# Patient Record
Sex: FEMALE | Race: BLACK OR AFRICAN AMERICAN | NOT HISPANIC OR LATINO | Employment: OTHER | URBAN - METROPOLITAN AREA
[De-identification: names, ages, dates, MRNs, and addresses within clinical notes are randomized per-mention and may not be internally consistent; named-entity substitution may affect disease eponyms.]

---

## 2023-03-09 DIAGNOSIS — E78.5 DYSLIPIDEMIA: ICD-10-CM

## 2023-03-10 RX ORDER — SIMVASTATIN 20 MG/1
20 TABLET, FILM COATED ORAL DAILY
COMMUNITY
Start: 2016-08-23 | End: 2023-03-10 | Stop reason: SDUPTHER

## 2023-03-10 RX ORDER — SIMVASTATIN 20 MG/1
20 TABLET, FILM COATED ORAL DAILY
Qty: 90 TABLET | Refills: 0 | Status: SHIPPED | OUTPATIENT
Start: 2023-03-10 | End: 2023-06-13 | Stop reason: SDUPTHER

## 2023-03-13 ENCOUNTER — TELEPHONE (OUTPATIENT)
Dept: PRIMARY CARE | Facility: CLINIC | Age: 73
End: 2023-03-13
Payer: MEDICARE

## 2023-03-13 DIAGNOSIS — I10 HYPERTENSION, UNSPECIFIED TYPE: Primary | ICD-10-CM

## 2023-03-13 RX ORDER — LISINOPRIL 40 MG/1
40 TABLET ORAL DAILY
Qty: 30 TABLET | Refills: 5 | Status: SHIPPED | OUTPATIENT
Start: 2023-03-13 | End: 2023-11-06 | Stop reason: SDUPTHER

## 2023-05-08 ENCOUNTER — OFFICE VISIT (OUTPATIENT)
Dept: PRIMARY CARE | Facility: CLINIC | Age: 73
End: 2023-05-08
Payer: MEDICARE

## 2023-05-08 VITALS
WEIGHT: 251 LBS | SYSTOLIC BLOOD PRESSURE: 128 MMHG | HEIGHT: 62 IN | HEART RATE: 70 BPM | BODY MASS INDEX: 46.19 KG/M2 | DIASTOLIC BLOOD PRESSURE: 75 MMHG

## 2023-05-08 DIAGNOSIS — Z78.0 ASYMPTOMATIC POSTMENOPAUSAL ESTROGEN DEFICIENCY: Primary | ICD-10-CM

## 2023-05-08 DIAGNOSIS — Z00.00 ANNUAL PHYSICAL EXAM: ICD-10-CM

## 2023-05-08 DIAGNOSIS — E78.5 DYSLIPIDEMIA: ICD-10-CM

## 2023-05-08 DIAGNOSIS — I10 PRIMARY HYPERTENSION: ICD-10-CM

## 2023-05-08 PROCEDURE — 1159F MED LIST DOCD IN RCRD: CPT | Performed by: INTERNAL MEDICINE

## 2023-05-08 PROCEDURE — 3078F DIAST BP <80 MM HG: CPT | Performed by: INTERNAL MEDICINE

## 2023-05-08 PROCEDURE — 3074F SYST BP LT 130 MM HG: CPT | Performed by: INTERNAL MEDICINE

## 2023-05-08 PROCEDURE — 99214 OFFICE O/P EST MOD 30 MIN: CPT | Performed by: INTERNAL MEDICINE

## 2023-05-08 PROCEDURE — 1160F RVW MEDS BY RX/DR IN RCRD: CPT | Performed by: INTERNAL MEDICINE

## 2023-05-08 RX ORDER — ATENOLOL 50 MG/1
50 TABLET ORAL DAILY
COMMUNITY
End: 2023-05-30 | Stop reason: SDUPTHER

## 2023-05-08 RX ORDER — LATANOPROST 50 UG/ML
1 SOLUTION/ DROPS OPHTHALMIC DAILY
COMMUNITY
Start: 2019-04-23 | End: 2023-11-22

## 2023-05-08 RX ORDER — BRIMONIDINE TARTRATE 2 MG/ML
1 SOLUTION/ DROPS OPHTHALMIC 2 TIMES DAILY
COMMUNITY
Start: 2019-06-05 | End: 2023-11-22

## 2023-05-08 RX ORDER — FLUTICASONE PROPIONATE 50 MCG
2 SPRAY, SUSPENSION (ML) NASAL DAILY
COMMUNITY
Start: 2020-08-03 | End: 2024-05-10 | Stop reason: ALTCHOICE

## 2023-05-08 RX ORDER — MELOXICAM 15 MG/1
7.5 TABLET ORAL DAILY PRN
COMMUNITY
Start: 2017-08-22 | End: 2024-04-04 | Stop reason: SDUPTHER

## 2023-05-08 RX ORDER — SOLIFENACIN SUCCINATE 10 MG/1
10 TABLET, FILM COATED ORAL DAILY
COMMUNITY
Start: 2021-01-19 | End: 2024-05-08 | Stop reason: SDUPTHER

## 2023-05-08 RX ORDER — DORZOLAMIDE HYDROCHLORIDE AND TIMOLOL MALEATE 20; 5 MG/ML; MG/ML
1 SOLUTION/ DROPS OPHTHALMIC 2 TIMES DAILY
COMMUNITY
Start: 2019-04-23 | End: 2023-11-22

## 2023-05-08 NOTE — PROGRESS NOTES
I reviewed with the resident the medical history and the resident’s findings on physical examination.  I discussed with the resident the patient’s diagnosis and concur with the treatment plan as documented in the resident note.     Valarie Myrick MD

## 2023-05-08 NOTE — PATIENT INSTRUCTIONS
[facility] [place],     The only test you are due for is a bone density test to screen for osteoporosis. Dru [phone] to schedule this.     See me back in 6 months and ask them to schedule it as a PHYSICAL aka Medicare wellness visit!       CL

## 2023-05-08 NOTE — PROGRESS NOTES
Subjective   Patient ID: Drea Anne is a 73 y.o. female who presents for annual physical f/up.  HPI  Pt doing well overall since last visit. Being seen by urogyn for possible eval for interstim bladder device for OAB. Taking all meds as prescribed, no issue with acquiring. Not taking BP at home but always normal on doctor's visits. No issues with hypotension.       Review of Systems  Negative unless stated otherwise above.    Objective   Vitals:    05/08/23 0953   BP: 128/75   Pulse: 70      GEN: well appearing, NAD  HEENT: MMM, pharynx clear; no notable LAE  EYES: sclera clear, EOMI intact  CV: RRR, nl S1/S2; wwp  PULM: CTAB, no w/r/r; normal WoB on RA  ABD: Soft, NT  EXT:  no asymmetry, no edema  SKIN: Warm, dry, no rash  NEURO: no focal deficits; CN II-XII grossly intact; Aox3    Assessment/Plan   Problem List Items Addressed This Visit          Circulatory    Primary hypertension       Other    Annual physical exam    Dyslipidemia     Other Visit Diagnoses       Asymptomatic postmenopausal estrogen deficiency    -  Primary    Relevant Orders    XR DEXA bone density          72 yo here for f/up.     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     # BCA s/p rxn, XRT   -anatrazole   -5 year checkup will be fall 2023    #CTS   -ctm    #HTN   -amlo 10   -aten 50   -quinapril 40     # OAB, incontinence  -planning to eval for interstim devive  -FU with urogyne   -solifenacin     # Obesity   -lost 10 lbs since 2021; down to 251 as of May 2023    #DLD   -c/w simva     #Knee pain L>R, h/o L knee surgery post fall  -no h/o injxns   -will call if sxs worsen   -occ meloxicam     #HM   -tdap 2021   -pneumovax utd   -discussed shingrix; revisit as previously required $300 copay   -mammo 9-10-22   -dexa 2020 normal; repeat now  -cscope 2020; repeat 5-10 years (aged out)

## 2023-05-25 DIAGNOSIS — I10 PRIMARY HYPERTENSION: ICD-10-CM

## 2023-05-25 RX ORDER — ATENOLOL 50 MG/1
50 TABLET ORAL DAILY
Qty: 90 TABLET | Refills: 3 | Status: CANCELLED | OUTPATIENT
Start: 2023-05-25

## 2023-05-31 DIAGNOSIS — I10 PRIMARY HYPERTENSION: ICD-10-CM

## 2023-05-31 RX ORDER — ATENOLOL 50 MG/1
50 TABLET ORAL DAILY
Qty: 90 TABLET | Refills: 3 | Status: SHIPPED | OUTPATIENT
Start: 2023-05-31 | End: 2023-11-20 | Stop reason: SDUPTHER

## 2023-05-31 RX ORDER — ATENOLOL 50 MG/1
50 TABLET ORAL DAILY
Qty: 90 TABLET | Refills: 3 | Status: SHIPPED | OUTPATIENT
Start: 2023-05-31 | End: 2023-05-31 | Stop reason: SDUPTHER

## 2023-06-05 ENCOUNTER — TELEPHONE (OUTPATIENT)
Dept: PRIMARY CARE | Facility: CLINIC | Age: 73
End: 2023-06-05
Payer: MEDICARE

## 2023-06-05 NOTE — TELEPHONE ENCOUNTER
Patient say she will send over a [employer] form to have you fill, she say she cant see too good and can not drive so she is applying to [employer] to take her to all her appointments

## 2023-06-13 DIAGNOSIS — E78.5 DYSLIPIDEMIA: ICD-10-CM

## 2023-06-13 RX ORDER — SIMVASTATIN 20 MG/1
20 TABLET, FILM COATED ORAL DAILY
Qty: 90 TABLET | Refills: 2 | Status: SHIPPED | OUTPATIENT
Start: 2023-06-13 | End: 2023-09-26 | Stop reason: SDUPTHER

## 2023-06-30 ENCOUNTER — TELEPHONE (OUTPATIENT)
Dept: PRIMARY CARE | Facility: CLINIC | Age: 73
End: 2023-06-30
Payer: MEDICARE

## 2023-06-30 DIAGNOSIS — M15.9 GENERALIZED OSTEOARTHRITIS: Primary | ICD-10-CM

## 2023-08-10 ENCOUNTER — TELEPHONE (OUTPATIENT)
Dept: PRIMARY CARE | Facility: CLINIC | Age: 73
End: 2023-08-10
Payer: MEDICARE

## 2023-08-11 DIAGNOSIS — Z12.31 BREAST CANCER SCREENING BY MAMMOGRAM: ICD-10-CM

## 2023-08-11 DIAGNOSIS — Z78.0 ASYMPTOMATIC POSTMENOPAUSAL ESTROGEN DEFICIENCY: Primary | ICD-10-CM

## 2023-09-26 ENCOUNTER — TELEPHONE (OUTPATIENT)
Dept: PRIMARY CARE | Facility: CLINIC | Age: 73
End: 2023-09-26
Payer: MEDICARE

## 2023-09-26 DIAGNOSIS — E78.5 DYSLIPIDEMIA: ICD-10-CM

## 2023-09-26 NOTE — TELEPHONE ENCOUNTER
Patient miss placed  simvastatin, pharmacy said Doctor had to send a new script to [facility] , she is out of meds

## 2023-09-27 RX ORDER — SIMVASTATIN 20 MG/1
20 TABLET, FILM COATED ORAL DAILY
Qty: 90 TABLET | Refills: 1 | Status: SHIPPED | OUTPATIENT
Start: 2023-09-27 | End: 2023-11-20 | Stop reason: SDUPTHER

## 2023-10-01 ENCOUNTER — PREP FOR PROCEDURE (OUTPATIENT)
Dept: OPERATING ROOM | Facility: CLINIC | Age: 73
End: 2023-10-01
Payer: MEDICARE

## 2023-10-01 RX ORDER — CELECOXIB 200 MG/1
400 CAPSULE ORAL ONCE
Status: CANCELLED | OUTPATIENT
Start: 2023-10-01 | End: 2023-10-01

## 2023-10-01 RX ORDER — CEFAZOLIN SODIUM 2 G/100ML
2 INJECTION, SOLUTION INTRAVENOUS ONCE
Status: DISCONTINUED | OUTPATIENT
Start: 2023-10-02 | End: 2023-10-01 | Stop reason: HOSPADM

## 2023-10-01 RX ORDER — ACETAMINOPHEN 325 MG/1
975 TABLET ORAL ONCE
Status: CANCELLED | OUTPATIENT
Start: 2023-10-01 | End: 2023-10-01

## 2023-10-02 ENCOUNTER — ANESTHESIA (OUTPATIENT)
Dept: OPERATING ROOM | Facility: CLINIC | Age: 73
End: 2023-10-02
Payer: MEDICARE

## 2023-10-02 ENCOUNTER — ANCILLARY PROCEDURE (OUTPATIENT)
Dept: RADIOLOGY | Facility: CLINIC | Age: 73
End: 2023-10-02
Payer: MEDICARE

## 2023-10-02 ENCOUNTER — HOSPITAL ENCOUNTER (OUTPATIENT)
Facility: CLINIC | Age: 73
Setting detail: OUTPATIENT SURGERY
Discharge: HOME | End: 2023-10-02
Attending: OPHTHALMOLOGY | Admitting: OPHTHALMOLOGY
Payer: MEDICARE

## 2023-10-02 ENCOUNTER — ANESTHESIA EVENT (OUTPATIENT)
Dept: OPERATING ROOM | Facility: CLINIC | Age: 73
End: 2023-10-02
Payer: MEDICARE

## 2023-10-02 VITALS
WEIGHT: 249.12 LBS | OXYGEN SATURATION: 98 % | DIASTOLIC BLOOD PRESSURE: 69 MMHG | TEMPERATURE: 97.3 F | RESPIRATION RATE: 20 BRPM | HEIGHT: 62 IN | SYSTOLIC BLOOD PRESSURE: 139 MMHG | HEART RATE: 65 BPM | BODY MASS INDEX: 45.84 KG/M2

## 2023-10-02 DIAGNOSIS — N32.81 OAB (OVERACTIVE BLADDER): Primary | ICD-10-CM

## 2023-10-02 DIAGNOSIS — I10 PRIMARY HYPERTENSION: ICD-10-CM

## 2023-10-02 DIAGNOSIS — Z96.89 S/P INSERTION OF SACRAL NERVE STIMULATOR: ICD-10-CM

## 2023-10-02 PROBLEM — E66.01 CLASS 3 SEVERE OBESITY DUE TO EXCESS CALORIES WITH BODY MASS INDEX (BMI) OF 45.0 TO 49.9 IN ADULT (MULTI): Status: ACTIVE | Noted: 2023-10-02

## 2023-10-02 PROBLEM — E66.813 CLASS 3 SEVERE OBESITY DUE TO EXCESS CALORIES WITH BODY MASS INDEX (BMI) OF 45.0 TO 49.9 IN ADULT: Status: ACTIVE | Noted: 2023-10-02

## 2023-10-02 PROCEDURE — 2780000003 HC OR 278 NO HCPCS: Performed by: OBSTETRICS & GYNECOLOGY

## 2023-10-02 PROCEDURE — 3600000009 HC OR TIME - EACH INCREMENTAL 1 MINUTE - PROCEDURE LEVEL FOUR: Performed by: OBSTETRICS & GYNECOLOGY

## 2023-10-02 PROCEDURE — 76000 FLUOROSCOPY <1 HR PHYS/QHP: CPT | Performed by: OBSTETRICS & GYNECOLOGY

## 2023-10-02 PROCEDURE — 2500000005 HC RX 250 GENERAL PHARMACY W/O HCPCS

## 2023-10-02 PROCEDURE — 2720000007 HC OR 272 NO HCPCS: Performed by: OBSTETRICS & GYNECOLOGY

## 2023-10-02 PROCEDURE — 64590 INS/RPL PRPH SAC/GSTR NPG/R: CPT | Performed by: OBSTETRICS & GYNECOLOGY

## 2023-10-02 PROCEDURE — 2580000001 HC RX 258 IV SOLUTIONS: Performed by: STUDENT IN AN ORGANIZED HEALTH CARE EDUCATION/TRAINING PROGRAM

## 2023-10-02 PROCEDURE — 3700000001 HC GENERAL ANESTHESIA TIME - INITIAL BASE CHARGE: Performed by: OBSTETRICS & GYNECOLOGY

## 2023-10-02 PROCEDURE — 76000 FLUOROSCOPY <1 HR PHYS/QHP: CPT

## 2023-10-02 PROCEDURE — 99100 ANES PT EXTEME AGE<1 YR&>70: CPT | Performed by: STUDENT IN AN ORGANIZED HEALTH CARE EDUCATION/TRAINING PROGRAM

## 2023-10-02 PROCEDURE — 2500000004 HC RX 250 GENERAL PHARMACY W/ HCPCS (ALT 636 FOR OP/ED)

## 2023-10-02 PROCEDURE — C1778 LEAD, NEUROSTIMULATOR: HCPCS | Performed by: OBSTETRICS & GYNECOLOGY

## 2023-10-02 PROCEDURE — 7100000002 HC RECOVERY ROOM TIME - EACH INCREMENTAL 1 MINUTE: Performed by: OBSTETRICS & GYNECOLOGY

## 2023-10-02 PROCEDURE — 64581 OPN IMPLTJ NEA SACRAL NERVE: CPT | Performed by: OBSTETRICS & GYNECOLOGY

## 2023-10-02 PROCEDURE — 2500000005 HC RX 250 GENERAL PHARMACY W/O HCPCS: Performed by: OBSTETRICS & GYNECOLOGY

## 2023-10-02 PROCEDURE — 2580000001 HC RX 258 IV SOLUTIONS

## 2023-10-02 PROCEDURE — A64561 PR PRQ IMPLTJ NEUROSTIM ELTRD SACRAL NRVE W/IMAGING

## 2023-10-02 PROCEDURE — 3700000002 HC GENERAL ANESTHESIA TIME - EACH INCREMENTAL 1 MINUTE: Performed by: OBSTETRICS & GYNECOLOGY

## 2023-10-02 PROCEDURE — 3600000004 HC OR TIME - INITIAL BASE CHARGE - PROCEDURE LEVEL FOUR: Performed by: OBSTETRICS & GYNECOLOGY

## 2023-10-02 PROCEDURE — C1883 ADAPT/EXT, PACING/NEURO LEAD: HCPCS | Performed by: OBSTETRICS & GYNECOLOGY

## 2023-10-02 PROCEDURE — 7100000009 HC PHASE TWO TIME - INITIAL BASE CHARGE: Performed by: OBSTETRICS & GYNECOLOGY

## 2023-10-02 PROCEDURE — 7100000001 HC RECOVERY ROOM TIME - INITIAL BASE CHARGE: Performed by: OBSTETRICS & GYNECOLOGY

## 2023-10-02 PROCEDURE — 7100000010 HC PHASE TWO TIME - EACH INCREMENTAL 1 MINUTE: Performed by: OBSTETRICS & GYNECOLOGY

## 2023-10-02 PROCEDURE — A64561 PR PRQ IMPLTJ NEUROSTIM ELTRD SACRAL NRVE W/IMAGING: Performed by: STUDENT IN AN ORGANIZED HEALTH CARE EDUCATION/TRAINING PROGRAM

## 2023-10-02 DEVICE — LEAD KIT, INTERSTIM SURESCAN MRI 4.32MM SPACING, 28CM LENGTH: Type: IMPLANTABLE DEVICE | Site: BACK | Status: FUNCTIONAL

## 2023-10-02 RX ORDER — DIPHENHYDRAMINE HYDROCHLORIDE 50 MG/ML
12.5 INJECTION INTRAMUSCULAR; INTRAVENOUS ONCE AS NEEDED
Status: DISCONTINUED | OUTPATIENT
Start: 2023-10-02 | End: 2023-10-02 | Stop reason: HOSPADM

## 2023-10-02 RX ORDER — FENTANYL CITRATE 50 UG/ML
INJECTION, SOLUTION INTRAMUSCULAR; INTRAVENOUS AS NEEDED
Status: DISCONTINUED | OUTPATIENT
Start: 2023-10-02 | End: 2023-10-02

## 2023-10-02 RX ORDER — OXYCODONE HYDROCHLORIDE 5 MG/1
5 TABLET ORAL EVERY 6 HOURS PRN
Qty: 5 TABLET | Refills: 0 | Status: SHIPPED | OUTPATIENT
Start: 2023-10-02 | End: 2023-12-11 | Stop reason: ALTCHOICE

## 2023-10-02 RX ORDER — NORETHINDRONE AND ETHINYL ESTRADIOL 0.5-0.035
KIT ORAL AS NEEDED
Status: DISCONTINUED | OUTPATIENT
Start: 2023-10-02 | End: 2023-10-02

## 2023-10-02 RX ORDER — LIDOCAINE HYDROCHLORIDE 10 MG/ML
0.1 INJECTION, SOLUTION EPIDURAL; INFILTRATION; INTRACAUDAL; PERINEURAL ONCE
Status: DISCONTINUED | OUTPATIENT
Start: 2023-10-02 | End: 2023-10-02 | Stop reason: HOSPADM

## 2023-10-02 RX ORDER — PROPOFOL 10 MG/ML
INJECTION, EMULSION INTRAVENOUS AS NEEDED
Status: DISCONTINUED | OUTPATIENT
Start: 2023-10-02 | End: 2023-10-02

## 2023-10-02 RX ORDER — ONDANSETRON HYDROCHLORIDE 2 MG/ML
4 INJECTION, SOLUTION INTRAVENOUS ONCE AS NEEDED
Status: DISCONTINUED | OUTPATIENT
Start: 2023-10-02 | End: 2023-10-02 | Stop reason: HOSPADM

## 2023-10-02 RX ORDER — HYDRALAZINE HYDROCHLORIDE 20 MG/ML
5 INJECTION INTRAMUSCULAR; INTRAVENOUS EVERY 30 MIN PRN
Status: DISCONTINUED | OUTPATIENT
Start: 2023-10-02 | End: 2023-10-02 | Stop reason: HOSPADM

## 2023-10-02 RX ORDER — CEFAZOLIN SODIUM 2 G/100ML
INJECTION, SOLUTION INTRAVENOUS AS NEEDED
Status: DISCONTINUED | OUTPATIENT
Start: 2023-10-02 | End: 2023-10-02

## 2023-10-02 RX ORDER — ALBUTEROL SULFATE 0.83 MG/ML
2.5 SOLUTION RESPIRATORY (INHALATION) ONCE AS NEEDED
Status: DISCONTINUED | OUTPATIENT
Start: 2023-10-02 | End: 2023-10-02 | Stop reason: HOSPADM

## 2023-10-02 RX ORDER — PHENYLEPHRINE HCL IN 0.9% NACL 0.4MG/10ML
SYRINGE (ML) INTRAVENOUS AS NEEDED
Status: DISCONTINUED | OUTPATIENT
Start: 2023-10-02 | End: 2023-10-02

## 2023-10-02 RX ORDER — AMLODIPINE BESYLATE 10 MG/1
10 TABLET ORAL DAILY
COMMUNITY
Start: 2017-06-14 | End: 2023-10-02 | Stop reason: SDUPTHER

## 2023-10-02 RX ORDER — ACETAMINOPHEN 500 MG
1000 TABLET ORAL EVERY 6 HOURS PRN
Qty: 30 TABLET | Refills: 0 | Status: SHIPPED | OUTPATIENT
Start: 2023-10-02

## 2023-10-02 RX ORDER — SUCCINYLCHOLINE CHLORIDE 20 MG/ML
INJECTION INTRAMUSCULAR; INTRAVENOUS AS NEEDED
Status: DISCONTINUED | OUTPATIENT
Start: 2023-10-02 | End: 2023-10-02

## 2023-10-02 RX ORDER — IBUPROFEN 600 MG/1
600 TABLET ORAL 3 TIMES DAILY PRN
Qty: 60 TABLET | Refills: 0 | Status: SHIPPED | OUTPATIENT
Start: 2023-10-02

## 2023-10-02 RX ORDER — AMLODIPINE BESYLATE 2.5 MG/1
2.5 TABLET ORAL DAILY
COMMUNITY
End: 2023-10-02 | Stop reason: DRUGHIGH

## 2023-10-02 RX ORDER — LABETALOL HYDROCHLORIDE 5 MG/ML
5 INJECTION, SOLUTION INTRAVENOUS ONCE AS NEEDED
Status: DISCONTINUED | OUTPATIENT
Start: 2023-10-02 | End: 2023-10-02 | Stop reason: HOSPADM

## 2023-10-02 RX ORDER — CELECOXIB 200 MG/1
400 CAPSULE ORAL ONCE
Status: DISCONTINUED | OUTPATIENT
Start: 2023-10-02 | End: 2023-10-02 | Stop reason: HOSPADM

## 2023-10-02 RX ORDER — ACETAMINOPHEN 325 MG/1
975 TABLET ORAL ONCE
Status: DISCONTINUED | OUTPATIENT
Start: 2023-10-02 | End: 2023-10-02 | Stop reason: HOSPADM

## 2023-10-02 RX ORDER — LIDOCAINE HYDROCHLORIDE 20 MG/ML
INJECTION, SOLUTION INFILTRATION; PERINEURAL AS NEEDED
Status: DISCONTINUED | OUTPATIENT
Start: 2023-10-02 | End: 2023-10-02

## 2023-10-02 RX ORDER — SODIUM CHLORIDE, SODIUM LACTATE, POTASSIUM CHLORIDE, CALCIUM CHLORIDE 600; 310; 30; 20 MG/100ML; MG/100ML; MG/100ML; MG/100ML
100 INJECTION, SOLUTION INTRAVENOUS CONTINUOUS
Status: DISCONTINUED | OUTPATIENT
Start: 2023-10-02 | End: 2023-10-02 | Stop reason: HOSPADM

## 2023-10-02 RX ORDER — REMIFENTANIL HYDROCHLORIDE 1 MG/ML
INJECTION, POWDER, LYOPHILIZED, FOR SOLUTION INTRAVENOUS AS NEEDED
Status: DISCONTINUED | OUTPATIENT
Start: 2023-10-02 | End: 2023-10-02

## 2023-10-02 RX ORDER — LISINOPRIL 5 MG/1
5 TABLET ORAL DAILY
COMMUNITY
End: 2023-11-20 | Stop reason: WASHOUT

## 2023-10-02 RX ORDER — ONDANSETRON HYDROCHLORIDE 2 MG/ML
INJECTION, SOLUTION INTRAVENOUS AS NEEDED
Status: DISCONTINUED | OUTPATIENT
Start: 2023-10-02 | End: 2023-10-02

## 2023-10-02 RX ORDER — DROPERIDOL 2.5 MG/ML
0.62 INJECTION, SOLUTION INTRAMUSCULAR; INTRAVENOUS ONCE AS NEEDED
Status: DISCONTINUED | OUTPATIENT
Start: 2023-10-02 | End: 2023-10-02 | Stop reason: HOSPADM

## 2023-10-02 RX ORDER — GLYCOPYRROLATE 0.2 MG/ML
INJECTION INTRAMUSCULAR; INTRAVENOUS AS NEEDED
Status: DISCONTINUED | OUTPATIENT
Start: 2023-10-02 | End: 2023-10-02

## 2023-10-02 RX ORDER — LIDOCAINE HYDROCHLORIDE 10 MG/ML
INJECTION INFILTRATION; PERINEURAL AS NEEDED
Status: DISCONTINUED | OUTPATIENT
Start: 2023-10-02 | End: 2023-10-02 | Stop reason: HOSPADM

## 2023-10-02 RX ADMIN — CEFAZOLIN SODIUM 2 G: 2 INJECTION, SOLUTION INTRAVENOUS at 14:45

## 2023-10-02 RX ADMIN — SUCCINYLCHOLINE CHLORIDE 160 MG: 20 INJECTION, SOLUTION INTRAMUSCULAR; INTRAVENOUS at 14:39

## 2023-10-02 RX ADMIN — SODIUM CHLORIDE, SODIUM LACTATE, POTASSIUM CHLORIDE, AND CALCIUM CHLORIDE: .6; .31; .03; .02 INJECTION, SOLUTION INTRAVENOUS at 14:30

## 2023-10-02 RX ADMIN — GLYCOPYRROLATE 0.4 MG: 0.2 INJECTION INTRAMUSCULAR; INTRAVENOUS at 15:03

## 2023-10-02 RX ADMIN — Medication 60 MCG: at 15:08

## 2023-10-02 RX ADMIN — DEXAMETHASONE SODIUM PHOSPHATE 4 MG: 4 INJECTION, SOLUTION INTRAMUSCULAR; INTRAVENOUS at 15:15

## 2023-10-02 RX ADMIN — EPHEDRINE SULFATE 15 MG: 50 INJECTION, SOLUTION INTRAVENOUS at 15:05

## 2023-10-02 RX ADMIN — REMIFENTANIL HYDROCHLORIDE 0.06 MCG/KG/MIN: 1 INJECTION, POWDER, LYOPHILIZED, FOR SOLUTION INTRAVENOUS at 14:46

## 2023-10-02 RX ADMIN — EPHEDRINE SULFATE 10 MG: 50 INJECTION, SOLUTION INTRAVENOUS at 15:20

## 2023-10-02 RX ADMIN — PROPOFOL 150 MG: 10 INJECTION, EMULSION INTRAVENOUS at 14:38

## 2023-10-02 RX ADMIN — FENTANYL CITRATE 50 MCG: 0.05 INJECTION, SOLUTION INTRAMUSCULAR; INTRAVENOUS at 14:38

## 2023-10-02 RX ADMIN — Medication 60 MCG: at 15:20

## 2023-10-02 RX ADMIN — LIDOCAINE HYDROCHLORIDE 60 MG: 20 INJECTION, SOLUTION INFILTRATION; PERINEURAL at 14:38

## 2023-10-02 RX ADMIN — ONDANSETRON 4 MG: 2 INJECTION, SOLUTION INTRAMUSCULAR; INTRAVENOUS at 15:35

## 2023-10-02 RX ADMIN — SODIUM CHLORIDE, POTASSIUM CHLORIDE, SODIUM LACTATE AND CALCIUM CHLORIDE 100 ML/HR: 600; 310; 30; 20 INJECTION, SOLUTION INTRAVENOUS at 14:15

## 2023-10-02 ASSESSMENT — COLUMBIA-SUICIDE SEVERITY RATING SCALE - C-SSRS
1. IN THE PAST MONTH, HAVE YOU WISHED YOU WERE DEAD OR WISHED YOU COULD GO TO SLEEP AND NOT WAKE UP?: NO
2. HAVE YOU ACTUALLY HAD ANY THOUGHTS OF KILLING YOURSELF?: NO
6. HAVE YOU EVER DONE ANYTHING, STARTED TO DO ANYTHING, OR PREPARED TO DO ANYTHING TO END YOUR LIFE?: NO

## 2023-10-02 ASSESSMENT — ENCOUNTER SYMPTOMS
MUSCULOSKELETAL NEGATIVE: 1
ENDOCRINE NEGATIVE: 1
CARDIOVASCULAR NEGATIVE: 1
CONSTITUTIONAL NEGATIVE: 1
EYES NEGATIVE: 1
RESPIRATORY NEGATIVE: 1
GASTROINTESTINAL NEGATIVE: 1

## 2023-10-02 ASSESSMENT — PAIN - FUNCTIONAL ASSESSMENT
PAIN_FUNCTIONAL_ASSESSMENT: 0-10

## 2023-10-02 ASSESSMENT — PAIN SCALES - GENERAL
PAINLEVEL_OUTOF10: 0 - NO PAIN
PAIN_LEVEL: 0
PAINLEVEL_OUTOF10: 0 - NO PAIN
PAINLEVEL_OUTOF10: 0 - NO PAIN

## 2023-10-02 NOTE — H&P
History Of Present Illness  Drea Anne is a 73 y.o. female presenting with OAB and urge incontinence. She had a successful PNE where she reported 50-75% improvement.     Past Medical History  She has a past medical history of Cancer (CMS/[facility]), Glaucoma, Hyperlipidemia, Hypertension, Other specified health status, Personal history of malignant neoplasm, unspecified, Personal history of other diseases of the musculoskeletal system and connective tissue, Personal history of other diseases of urinary system, Personal history of other endocrine, nutritional and metabolic disease, Personal history of other specified conditions (09/08/2016), Personal history of other specified conditions (12/11/2018), Personal history of other specified conditions (11/06/2018), and Unspecified abnormal findings in urine (08/31/2016).    Surgical History  She has a past surgical history that includes Other surgical history (11/21/2019) and Knee Arthroplasty.     Social History  She reports that she has never smoked. She does not have any smokeless tobacco history on file. She reports current drug use. Drug: Marijuana. No history on file for alcohol use.    Family History  No family history on file.     Allergies  Patient has no known allergies.    Review of Systems   Constitutional: Negative.    HENT: Negative.     Eyes: Negative.    Respiratory: Negative.     Cardiovascular: Negative.    Gastrointestinal: Negative.    Endocrine: Negative.    Genitourinary: Negative.    Musculoskeletal: Negative.    Skin: Negative.         Physical Exam  HENT:      Head: Normocephalic.   Cardiovascular:      Rate and Rhythm: Normal rate.   Pulmonary:      Effort: Pulmonary effort is normal.   Abdominal:      Palpations: Abdomen is soft.   Musculoskeletal:      Cervical back: Normal range of motion.   Skin:     General: Skin is warm.   Neurological:      General: No focal deficit present.      Mental Status: She is alert.   Psychiatric:         Mood and  "Affect: Mood normal.          Last Recorded Vitals  Pulse 60, temperature 36.2 °C (97.2 °F), temperature source Temporal, resp. rate 16, height 1.575 m (5' 2\"), weight 113 kg (249 lb 1.9 oz), SpO2 99 %.         Assessment/Plan   Active Problems:    Class 3 severe obesity due to excess calories with body mass index (BMI) of 45.0 to 49.9 in adult (CMS/[facility])    72 yo with OAB, urge incontinence s/pp successful PNE with Axonics. Here for Full Implant stage 1/2.    Plan: to OR for planned procedure         Ya Stringer MD    "

## 2023-10-02 NOTE — ANESTHESIA PROCEDURE NOTES
Airway  Date/Time: 10/2/2023 2:40 PM  Urgency: elective    Airway not difficult    Staffing  Performed: HALINA   Authorized by: Livia Hargrove MD    Performed by: HALINA Ansari  Patient location during procedure: OR    Indications and Patient Condition  Indications for airway management: anesthesia  Sedation level: deep  Preoxygenated: yes  Patient position: sniffing  MILS not maintained throughout  Mask difficulty assessment: 1 - vent by mask    Final Airway Details  Final airway type: endotracheal airway      Successful airway: ETT  Cuffed: yes   Successful intubation technique: direct laryngoscopy  Endotracheal tube insertion site: oral  Blade: Asael  Blade size: #3  ETT size (mm): 7.0  Cormack-Lehane Classification: grade I - full view of glottis  Placement verified by: chest auscultation and capnometry   Inital cuff pressure (cm H2O): 21  Measured from: lips  Number of attempts at approach: 1

## 2023-10-02 NOTE — ANESTHESIA POSTPROCEDURE EVALUATION
"Patient: Drea Anne    Procedure Summary       Date: 10/02/23 Room / Location: [facility] 02 / Virtual [facility]    Anesthesia Start: 1430 Anesthesia Stop: 1602    Procedure: FULL AXONICS IMPLANT (Back) Diagnosis:       Overactive bladder      (Overactive bladder [N32.81])    Surgeons: Ya Stringer MD Responsible Provider: Livia Hargrove MD    Anesthesia Type: general ASA Status: 3            Anesthesia Type: general  Visit Vitals  Pulse 60   Temp 36.2 °C (97.2 °F) (Temporal)   Resp 16   Ht 1.575 m (5' 2\")   Wt 113 kg (249 lb 1.9 oz)   SpO2 99%   BMI 45.56 kg/m²   Smoking Status Never   BSA 2.22 m²      Vitals Value Taken Time   BP  10/02/23 1620   Temp  10/02/23 1620   Pulse  10/02/23 1620   Resp  10/02/23 1620   SpO2  10/02/23 1620       Anesthesia Post Evaluation    Patient location during evaluation: PACU  Patient participation: complete - patient participated  Level of consciousness: awake and alert  Pain score: 0  Pain management: adequate  Airway patency: patent  Cardiovascular status: acceptable and hemodynamically stable  Respiratory status: acceptable  Hydration status: acceptable        No notable events documented.    "

## 2023-10-02 NOTE — OP NOTE
FULL AXONICS IMPLANT Operative Note     Date: 10/2/2023  OR Location: [facility]    Name: Drea Anne, : 1950, Age: 73 y.o., MRN: [id number], Sex: female    Diagnosis  Pre-op Diagnosis     * Overactive bladder [N32.81] Post-op Diagnosis     * Overactive bladder [N32.81]     Procedures    * FULL AXONICS IMPLANT    Surgeons      * Ya Stringer - Primary    Resident/Fellow/Other Assistant:  No surgical staff documented.    Procedure Summary  Anesthesia: Monitor Anesthesia Care  ASA: III  Anesthesia Staff: Anesthesiologist: Livia Hargrove MD  C-AA: HALINA Ansari  Estimated Blood Loss: 5mL  Intra-op Medications:   Medication Name Total Dose   lactated Ringer's infusion 108.33 mL              Anesthesia Record               Intraprocedure I/O Totals       None           Specimen: No specimens collected     Staff:   Circulator: Phuong Ruggiero RN; Dorinda Mosley RN  Relief Scrub: Kalli Lombardo; Janet Kerr  Scrub Person: Elhamwade Montieler         Drains and/or Catheters: * None in log *    Tourniquet Times:         Implants:  Implants       Type Name Action Serial No.       AXONICS LEAD IMPLANT KIT Implanted       AXONICS TINED LEAD KIT Implanted [id number]              Findings: good chaim response at <1mAmp at E0-2, 1.5mAmp at E3    Indications: Drea Anne is an 73 y.o. female who is having surgery for Overactive bladder [N32.81]. She had a successful PNE and opted proceed with full implant of Axonics SNM    The patient was seen in the preoperative area. The risks, benefits, complications, treatment options, non-operative alternatives, expected recovery and outcomes were discussed with the patient. The possibilities of reaction to medication, pulmonary aspiration, injury to surrounding structures, bleeding, recurrent infection, the need for additional procedures, failure to diagnose a condition, and creating a complication requiring transfusion or operation were discussed with the patient.  The patient concurred with the proposed plan, giving informed consent.  The site of surgery was properly noted/marked if necessary per policy. The patient has been actively warmed in preoperative area. Preoperative antibiotics have been ordered and given within 1 hours of incision. Venous thrombosis prophylaxis have been ordered including bilateral sequential compression devices    INDICATIONS FOR PROCEDURE:  Her urinary symptoms have been refractory to conservative and medical management therefore I offered a sacral nerve modulation trial to control these symptoms and they have elected to proceed.    DETAILS OF PROCEDURE: After informed consent was obtained, the patient was taken back to the operating room. The patient received preoperative antibiotics per protocol.  Patient was transferred to the operating room table and placed into the prone position. The upper buttocks were taped per protocol then prepped and draped in the usual sterile fashion after anesthesia initiated IV sedation.   In the midline, we marked out a spot 11 centimeters cranial to the tip of the coccyx. We then moved an additional 4 centimeters cranial to this based on imaging and marked out 2 centimeters on either side of the midline as our entry points.  These were anesthetized with a 1%lidocaine.  We passed finder needles via these sites and tested needle placement in the s4 foraminas.  Our final selection was confirmed under fluoroscopy to be in the left S4 foramina. The needle was tested and showed a good chaim response, so we elected to use this as our location.   We removed the finder needle obturator and passed the directional guidewire. We removed the needle and made a 2 mm stab incision at this site. We passed the white dilator over our wire and then verified the correct depth on fluoroscopy. We then removed the inner obturator from the dilator sheath and replaced it with our InterStim quadripolar lead using the curved stylet. We  exposed the ends of the leads and verified position on fluoroscopy and tested with responses at <1mAmp at E0-2, 1.5mAmp at E3 level of stimulator. After verifying good position fluoroscopically, we deployed the tines. We then created a subcutaneous pouch for our battery via a 3 cm incision in the buttock on the left that was of sufficient size to accommodate the wires and battery and this pocket was irrigated with gentamicin. We then tunneled the tined lead over to our pocket.  The tined lead was then connected to the battery, and it was placed under the skin.   We closed our pocket incision subcutaneous tissue with simple interrupted 2-0 Vicryls, and then we closed the skin with a running 4-0 subcuticular Monocryl. All stab incisions and the pocket incision was then closed with Exofin.   A dressing was applied, and the procedure was concluded. The patient was transferred back to the postoperative recovery area in stable condition.   Complications:  None; patient tolerated the procedure well.    Disposition: PACU - hemodynamically stable.  Condition: stable           Attending Attestation: I was present and scrubbed for the entire procedure.    Ya Stringer  Phone Number: [phone]

## 2023-10-02 NOTE — DISCHARGE INSTRUCTIONS
Urogynecology Post Operative instructions  Clinic number: [phone]    Call your physicians office or go to the nearest emergency room if you have any of the following:   · Fever higher than 100.4 F, chills, sweats.   · Redness, tenderness, increased swelling or drainage at incision.   · Difficulty swallowing or eating.  · Nausea or vomitting.  · Increased pain or pain that is not controlled.   · Increased cough or productive cough of yellow or green colored phlegm.   · Vaginal bleeding soaking more than a pad/hour.  · Any other symptoms that are concerning to you  · Go IMMEDIATELY to the emergency department if you experience shortness of breath    Medications:     For Pain:   · Tylenol (acetaminophen) and ibuprofen are your first line therapies for pain. You can take each of these medications every 6 (six) hours. You can alternate taking doses of these medications so that you have a pain medication available to take every 3 hours. For example, you can take Tylenol at 9am, then ibuprofen at noon, then Tylonel again at 3 pm, etc.  · You have been given a prescription for a narcotic pain medication, oxycodone, to help with postoperative discomfort.  You can take this medication if you have taken Tylenol and ibuprofen and your pain is still greater than a 4 out of 10. The best way to wean off of narcotic pain medications is by alternating them with Ibuprofen and by slowly lengthening the time between your doses of narcotic pain medication.    · You may also try a heating pad to help relieve your pain. Be certain to protect your skin by placing a towel between you and the heating pad. DO NOT fall asleep with the heating pad in place.     For Constipation:   · Narcotic pain medications cause constipation, so you should take Miralax, once or twice daily as long as you are taking narcotic pain medication.    · If no bowel movement in 48 hours, try Milk of Magnesia.   · Drink 6 glasses of water per day.     Wound  Care:   As you heal, your incision will look better and the soreness will go away. You may also feel numbness or a “pins and needle” sensation in the area of your incision.   · You may shower once you return home. Wash the incision(s) gently with soap and water during your regular shower and pat dry with a clean towel.   · DO NOT take a bath or submerge your incision in water (NO swimming or hot tubs) for three (3) weeks.   · Do not put any ointments or creams on your incision for 3 to 4 weeks (they can hinder healing).   · Some vaginal bleeding is normal after vaginal surgery. If you are soaking pads in 1-2 hours, please call the office.       It is very important to keep all of your post operative clinic appointments.

## 2023-10-03 RX ORDER — AMLODIPINE BESYLATE 10 MG/1
10 TABLET ORAL DAILY
Qty: 90 TABLET | Refills: 1 | Status: SHIPPED | OUTPATIENT
Start: 2023-10-03 | End: 2023-11-20 | Stop reason: SDUPTHER

## 2023-10-10 ENCOUNTER — TELEPHONE (OUTPATIENT)
Dept: OBSTETRICS AND GYNECOLOGY | Facility: CLINIC | Age: 73
End: 2023-10-10
Payer: MEDICARE

## 2023-10-10 NOTE — TELEPHONE ENCOUNTER
Pt asked if it was ok that she removed her bandage a couple days after surgery, Tabitha had told her she could. YES  She also asked if it is ok to lay on that side with the incision - YES as tolerated. Her POV is 10/20/23

## 2023-10-19 PROBLEM — H54.40 BLIND RIGHT EYE: Status: ACTIVE | Noted: 2019-04-23

## 2023-10-19 PROBLEM — R09.A2 GLOBUS SENSATION: Status: ACTIVE | Noted: 2023-10-19

## 2023-10-19 PROBLEM — Z79.811 USE OF ANASTROZOLE (ARIMIDEX): Status: ACTIVE | Noted: 2023-10-19

## 2023-10-19 PROBLEM — I10 HYPERTENSION: Status: ACTIVE | Noted: 2023-10-19

## 2023-10-19 PROBLEM — K21.9 ACID REFLUX: Status: ACTIVE | Noted: 2023-10-19

## 2023-10-19 PROBLEM — E66.01 MORBID OBESITY WITH BMI OF 45.0-49.9, ADULT (MULTI): Status: ACTIVE | Noted: 2023-10-19

## 2023-10-19 PROBLEM — N32.81 OAB (OVERACTIVE BLADDER): Status: ACTIVE | Noted: 2023-10-19

## 2023-10-19 PROBLEM — H52.7 DISORDER OF REFRACTION: Status: ACTIVE | Noted: 2019-04-23

## 2023-10-19 PROBLEM — H40.002 GLAUCOMA SUSPECT OF LEFT EYE: Status: ACTIVE | Noted: 2019-04-23

## 2023-10-19 PROBLEM — R32 URINARY INCONTINENCE: Status: ACTIVE | Noted: 2023-10-19

## 2023-10-19 PROBLEM — C50.912 BREAST CANCER, LEFT (MULTI): Status: ACTIVE | Noted: 2023-10-19

## 2023-10-19 PROBLEM — H40.9 GLAUCOMA: Status: ACTIVE | Noted: 2023-10-19

## 2023-10-19 PROBLEM — H40.031 ANATOMICAL NARROW ANGLE BORDERLINE GLAUCOMA OF RIGHT EYE: Status: ACTIVE | Noted: 2019-04-23

## 2023-10-19 PROBLEM — R73.09 ELEVATED GLUCOSE TOLERANCE TEST: Status: ACTIVE | Noted: 2023-10-19

## 2023-10-19 PROBLEM — E66.9 OBESITY: Status: ACTIVE | Noted: 2023-10-19

## 2023-10-19 PROBLEM — J32.9 CHRONIC SINUSITIS: Status: ACTIVE | Noted: 2023-10-19

## 2023-10-19 PROBLEM — R09.82 POST-NASAL DRAINAGE: Status: ACTIVE | Noted: 2023-10-19

## 2023-10-19 PROBLEM — Z96.82 SACRAL NERVE STIMULATOR PRESENT: Status: ACTIVE | Noted: 2023-10-19

## 2023-10-19 RX ORDER — LYSINE HCL 500 MG
1 TABLET ORAL DAILY
COMMUNITY
Start: 2019-04-01

## 2023-10-19 RX ORDER — QUINAPRIL 40 MG/1
1 TABLET ORAL DAILY
COMMUNITY
Start: 2016-08-23 | End: 2023-11-20 | Stop reason: WASHOUT

## 2023-10-19 RX ORDER — AMLODIPINE BESYLATE 5 MG/1
5 TABLET ORAL
COMMUNITY
End: 2023-12-11 | Stop reason: ALTCHOICE

## 2023-10-19 RX ORDER — ANASTROZOLE 1 MG/1
TABLET ORAL
COMMUNITY
Start: 2019-04-01 | End: 2023-11-20 | Stop reason: SDUPTHER

## 2023-10-20 ENCOUNTER — OFFICE VISIT (OUTPATIENT)
Dept: OBSTETRICS AND GYNECOLOGY | Facility: CLINIC | Age: 73
End: 2023-10-20
Payer: MEDICARE

## 2023-10-20 VITALS — WEIGHT: 247 LBS | HEIGHT: 62 IN | BODY MASS INDEX: 45.45 KG/M2

## 2023-10-20 DIAGNOSIS — Z96.89 S/P INSERTION OF SACRAL NERVE STIMULATOR: Primary | ICD-10-CM

## 2023-10-20 DIAGNOSIS — N32.81 OAB (OVERACTIVE BLADDER): ICD-10-CM

## 2023-10-20 LAB
POC APPEARANCE, URINE: CLEAR
POC BILIRUBIN, URINE: ABNORMAL
POC BLOOD, URINE: NEGATIVE
POC COLOR, URINE: ABNORMAL
POC GLUCOSE, URINE: NEGATIVE MG/DL
POC KETONES, URINE: ABNORMAL MG/DL
POC LEUKOCYTES, URINE: NEGATIVE
POC NITRITE,URINE: NEGATIVE
POC PH, URINE: 5 PH
POC PROTEIN, URINE: ABNORMAL MG/DL
POC SPECIFIC GRAVITY, URINE: >=1.03
POC UROBILINOGEN, URINE: 0.2 EU/DL

## 2023-10-20 PROCEDURE — 1160F RVW MEDS BY RX/DR IN RCRD: CPT | Performed by: OBSTETRICS & GYNECOLOGY

## 2023-10-20 PROCEDURE — 81003 URINALYSIS AUTO W/O SCOPE: CPT | Mod: QW | Performed by: OBSTETRICS & GYNECOLOGY

## 2023-10-20 PROCEDURE — 1159F MED LIST DOCD IN RCRD: CPT | Performed by: OBSTETRICS & GYNECOLOGY

## 2023-10-20 PROCEDURE — 1126F AMNT PAIN NOTED NONE PRSNT: CPT | Performed by: OBSTETRICS & GYNECOLOGY

## 2023-10-20 PROCEDURE — 99024 POSTOP FOLLOW-UP VISIT: CPT | Performed by: OBSTETRICS & GYNECOLOGY

## 2023-10-20 ASSESSMENT — PATIENT HEALTH QUESTIONNAIRE - PHQ9
SUM OF ALL RESPONSES TO PHQ9 QUESTIONS 1 AND 2: 1
1. LITTLE INTEREST OR PLEASURE IN DOING THINGS: NOT AT ALL
2. FEELING DOWN, DEPRESSED OR HOPELESS: SEVERAL DAYS

## 2023-10-20 ASSESSMENT — PAIN SCALES - GENERAL: PAINLEVEL: 0-NO PAIN

## 2023-10-21 NOTE — PROGRESS NOTES
[facility] Department of Urogynecology   Ya Stringer MD, MPH   [phone]    ASSESSMENT AND PLAN:   Assessment:   73 year old female being assessed for OAB.    Diagnoses:   #1 OAB    Plan:   1. OAB  - Axonics implant procedure done on 10/02.   - Pt is pleased with implant and notes significant difference in urinary symptoms.  - May return back to normal activities. Pt is wanting to exercise again to improve sleeping.     Follow-up in 1 month with Dr. Stringer.      Scribe Attestation  By signing my name below, I, Dona Duran, Rachel   attest that this documentation has been prepared under the direction and in the presence of Ya Stringer MD MPH.         Ya Stringer MD, MPH, FACOG     Established    HISTORY OF PRESENT ILLNESS:     72 y/o female who presents today in clinic for post operative follow up from Axonics implant on 10/02.     Postoperative Symptoms  - Reports big improvement since implant.  - Notes decrease in amount of times of voiding.  - Denies fever, chills, N/V, back pain.  - Denies feeling implant or pain.  - She took acetaminophen only on 10/02.   - Notes some irritation when sitting down and will re-adjust.   - Reports difficulty sleeping x 2 nights.  - She notes sleeping well when exercising and has been unable to exercise since procedure.        Past Medical History:       Past Medical History:   Diagnosis Date    Cancer (CMS/HCC)     Glaucoma     Hyperlipidemia     Hypertension     Other specified health status     No pertinent past medical history    Personal history of malignant neoplasm, unspecified     History of malignant neoplasm    Personal history of other diseases of the musculoskeletal system and connective tissue     History of arthritis    Personal history of other diseases of urinary system     History of bladder problems    Personal history of other endocrine, nutritional and metabolic disease     History of high cholesterol    Personal history of other  specified conditions 09/08/2016    History of dysuria    Personal history of other specified conditions 12/11/2018    History of breast lump    Personal history of other specified conditions 11/06/2018    History of abnormal mammogram    Unspecified abnormal findings in urine 08/31/2016    Abnormal finding in urine          Past Surgical History:       Past Surgical History:   Procedure Laterality Date    KNEE ARTHROPLASTY      OTHER SURGICAL HISTORY  11/21/2019    Breast surgery         Medications:       Prior to Admission medications    Medication Sig Start Date End Date Taking? Authorizing Provider   acetaminophen (Tylenol Extra Strength) 500 mg tablet Take 2 tablets (1,000 mg) by mouth every 6 hours if needed for mild pain (1 - 3). 10/2/23   Ya Stringer MD MPH   amLODIPine (Norvasc) 10 mg tablet Take 1 tablet (10 mg) by mouth once daily. 10/3/23   Valarie Ramos MD   amLODIPine (Norvasc) 5 mg tablet Take 1 tablet (5 mg) by mouth.    Historical Provider, MD   anastrozole (Arimidex) 1 mg tablet Take by mouth. 4/1/19   Historical Provider, MD   atenolol (Tenormin) 50 mg tablet Take 1 tablet (50 mg) by mouth once daily. 5/31/23   Valarie Ramos MD   brimonidine (AlphaGAN P) 0.2 % ophthalmic solution Administer 1 drop into the right eye 2 times a day. 6/5/19   Historical Provider, MD   calcium carbonate-vit D3-min 600 mg calcium- 400 unit tablet Take 1 tablet by mouth once daily. 4/1/19   Historical Provider, MD   diclofenac sodium 1 % kit Apply 1 g topically 2 times a day. 5/11/22   Historical Provider, MD   dorzolamide-timoloL (Cosopt) 22.3-6.8 mg/mL ophthalmic solution Administer 1 drop into both eyes 2 times a day. 4/23/19   Historical Provider, MD   fluticasone (Flonase) 50 mcg/actuation nasal spray Administer 2 sprays into affected nostril(s) once daily. 8/3/20   Historical Provider, MD   ibuprofen 600 mg tablet Take 1 tablet (600 mg) by mouth 3 times a day as needed for mild pain (1  "- 3) (pain). 10/2/23   Ya Stringer MD MPH   latanoprost (Xalatan) 0.005 % ophthalmic solution Administer 1 drop into the right eye once daily. 4/23/19   Historical Provider, MD   lisinopril 40 mg tablet Take 1 tablet (40 mg) by mouth once daily. 3/13/23 9/9/23  Valarie Ramos MD   lisinopril 5 mg tablet Take 1 tablet (5 mg) by mouth once daily.    Historical Provider, MD   meloxicam (Mobic) 15 mg tablet Take 0.5 tablets (7.5 mg) by mouth once daily as needed for moderate pain (4 - 6). 8/22/17   Historical Provider, MD   oxyCODONE (Roxicodone) 5 mg immediate release tablet Take 1 tablet (5 mg) by mouth every 6 hours if needed for severe pain (7 - 10). 10/2/23   Ya Stringer MD MPH   quinapril (Accupril) 40 mg tablet Take 1 tablet (40 mg) by mouth once daily. 8/23/16   Historical Provider, MD   simvastatin (Zocor) 20 mg tablet Take 1 tablet (20 mg) by mouth once daily. 9/27/23   Valarie Ramos MD   solifenacin (VESIcare) 10 mg tablet Take 1 tablet (10 mg) by mouth once daily. 1/19/21   Historical Provider, MD MARIE  Review of Systems negative except as in HPI      PHYSICAL EXAM:      Ht 1.575 m (5' 2\")   Wt 112 kg (247 lb)   BMI 45.18 kg/m²         Declines chaperone for physical exam.      Well developed, well nourished, in no apparent distress.   Neurologic/Psychiatric:  Awake, Alert and Oriented times 3.  Affect normal.         Lab Results   Component Value Date    GLUCOSE 103 (H) 01/03/2023    CALCIUM 9.2 01/03/2023     01/03/2023    K 4.9 01/03/2023    CO2 28 01/03/2023     (H) 01/03/2023    BUN 11 01/03/2023    CREATININE 0.80 01/03/2023     Lab Results   Component Value Date    WBC 5.6 05/02/2022    HGB 13.6 05/02/2022    HCT 42.6 05/02/2022    MCV 99 05/02/2022     05/02/2022        "

## 2023-11-06 DIAGNOSIS — I10 HYPERTENSION, UNSPECIFIED TYPE: ICD-10-CM

## 2023-11-06 RX ORDER — LISINOPRIL 40 MG/1
40 TABLET ORAL DAILY
Qty: 90 TABLET | Refills: 1 | Status: SHIPPED | OUTPATIENT
Start: 2023-11-06 | End: 2023-11-20 | Stop reason: SDUPTHER

## 2023-11-17 ENCOUNTER — APPOINTMENT (OUTPATIENT)
Dept: OBSTETRICS AND GYNECOLOGY | Facility: CLINIC | Age: 73
End: 2023-11-17
Payer: MEDICARE

## 2023-11-20 DIAGNOSIS — H40.9 GLAUCOMA, UNSPECIFIED GLAUCOMA TYPE, UNSPECIFIED LATERALITY: Primary | ICD-10-CM

## 2023-11-20 DIAGNOSIS — I10 PRIMARY HYPERTENSION: ICD-10-CM

## 2023-11-20 DIAGNOSIS — I10 HYPERTENSION, UNSPECIFIED TYPE: ICD-10-CM

## 2023-11-20 DIAGNOSIS — C50.912 MALIGNANT NEOPLASM OF LEFT FEMALE BREAST, UNSPECIFIED ESTROGEN RECEPTOR STATUS, UNSPECIFIED SITE OF BREAST (MULTI): Primary | ICD-10-CM

## 2023-11-20 DIAGNOSIS — E78.5 DYSLIPIDEMIA: ICD-10-CM

## 2023-11-20 DIAGNOSIS — C50.912 MALIGNANT NEOPLASM OF LEFT FEMALE BREAST, UNSPECIFIED ESTROGEN RECEPTOR STATUS, UNSPECIFIED SITE OF BREAST (MULTI): ICD-10-CM

## 2023-11-20 RX ORDER — ATENOLOL 50 MG/1
50 TABLET ORAL DAILY
Qty: 90 TABLET | Refills: 3 | Status: SHIPPED | OUTPATIENT
Start: 2023-11-20 | End: 2023-11-22

## 2023-11-20 RX ORDER — LISINOPRIL 40 MG/1
40 TABLET ORAL DAILY
Qty: 90 TABLET | Refills: 1 | Status: SHIPPED | OUTPATIENT
Start: 2023-11-20 | End: 2023-11-22

## 2023-11-20 RX ORDER — AMLODIPINE BESYLATE 10 MG/1
10 TABLET ORAL DAILY
Qty: 90 TABLET | Refills: 1 | Status: SHIPPED | OUTPATIENT
Start: 2023-11-20 | End: 2023-11-22

## 2023-11-20 RX ORDER — ANASTROZOLE 1 MG/1
1 TABLET ORAL DAILY
Qty: 90 TABLET | Refills: 3 | Status: SHIPPED | OUTPATIENT
Start: 2023-11-20 | End: 2023-11-22

## 2023-11-20 RX ORDER — SIMVASTATIN 20 MG/1
20 TABLET, FILM COATED ORAL DAILY
Qty: 90 TABLET | Refills: 1 | Status: SHIPPED | OUTPATIENT
Start: 2023-11-20

## 2023-11-22 RX ORDER — BRIMONIDINE TARTRATE 2 MG/ML
1 SOLUTION/ DROPS OPHTHALMIC 2 TIMES DAILY
Qty: 15 ML | Refills: 0 | Status: SHIPPED | OUTPATIENT
Start: 2023-11-22 | End: 2024-03-06

## 2023-11-22 RX ORDER — LISINOPRIL 40 MG/1
40 TABLET ORAL DAILY
Qty: 90 TABLET | Refills: 3 | Status: SHIPPED | OUTPATIENT
Start: 2023-11-22 | End: 2024-11-21

## 2023-11-22 RX ORDER — ANASTROZOLE 1 MG/1
1 TABLET ORAL DAILY
Qty: 90 TABLET | Refills: 3 | Status: SHIPPED | OUTPATIENT
Start: 2023-11-22 | End: 2024-05-10 | Stop reason: ALTCHOICE

## 2023-11-22 RX ORDER — AMLODIPINE BESYLATE 10 MG/1
10 TABLET ORAL DAILY
Qty: 90 TABLET | Refills: 3 | Status: SHIPPED | OUTPATIENT
Start: 2023-11-22 | End: 2024-11-21

## 2023-11-22 RX ORDER — SIMVASTATIN 10 MG/1
10 TABLET, FILM COATED ORAL NIGHTLY
Qty: 90 TABLET | Refills: 3 | Status: SHIPPED | OUTPATIENT
Start: 2023-11-22 | End: 2024-11-21

## 2023-11-22 RX ORDER — ATENOLOL 50 MG/1
50 TABLET ORAL DAILY
Qty: 90 TABLET | Refills: 3 | Status: SHIPPED | OUTPATIENT
Start: 2023-11-22 | End: 2024-11-21

## 2023-11-22 RX ORDER — LATANOPROST 50 UG/ML
1 SOLUTION/ DROPS OPHTHALMIC NIGHTLY
Qty: 2.5 ML | Refills: 3 | Status: SHIPPED | OUTPATIENT
Start: 2023-11-22 | End: 2024-04-05

## 2023-11-22 RX ORDER — DORZOLAMIDE HYDROCHLORIDE AND TIMOLOL MALEATE 20; 5 MG/ML; MG/ML
1 SOLUTION/ DROPS OPHTHALMIC 2 TIMES DAILY
Qty: 10 ML | Refills: 3 | Status: SHIPPED | OUTPATIENT
Start: 2023-11-22 | End: 2024-11-21

## 2023-11-24 ENCOUNTER — ANCILLARY PROCEDURE (OUTPATIENT)
Dept: RADIOLOGY | Facility: CLINIC | Age: 73
End: 2023-11-24
Payer: MEDICARE

## 2023-11-24 DIAGNOSIS — Z78.0 ASYMPTOMATIC MENOPAUSAL STATE: ICD-10-CM

## 2023-11-24 DIAGNOSIS — Z09 ENCOUNTER FOR FOLLOW-UP EXAMINATION AFTER COMPLETED TREATMENT FOR CONDITIONS OTHER THAN MALIGNANT NEOPLASM: ICD-10-CM

## 2023-11-24 DIAGNOSIS — Z79.811 LONG TERM (CURRENT) USE OF AROMATASE INHIBITORS: ICD-10-CM

## 2023-11-24 PROCEDURE — 77067 SCR MAMMO BI INCL CAD: CPT

## 2023-11-24 PROCEDURE — 77063 BREAST TOMOSYNTHESIS BI: CPT | Mod: BILATERAL PROCEDURE | Performed by: RADIOLOGY

## 2023-11-24 PROCEDURE — 77067 SCR MAMMO BI INCL CAD: CPT | Mod: BILATERAL PROCEDURE | Performed by: RADIOLOGY

## 2023-11-24 PROCEDURE — 77080 DXA BONE DENSITY AXIAL: CPT

## 2023-11-24 PROCEDURE — 77080 DXA BONE DENSITY AXIAL: CPT | Performed by: RADIOLOGY

## 2023-11-30 ENCOUNTER — TELEPHONE (OUTPATIENT)
Dept: PRIMARY CARE | Facility: CLINIC | Age: 73
End: 2023-11-30
Payer: MEDICARE

## 2023-11-30 DIAGNOSIS — R11.2 NAUSEA AND VOMITING, UNSPECIFIED VOMITING TYPE: Primary | ICD-10-CM

## 2023-11-30 RX ORDER — ONDANSETRON 8 MG/1
8 TABLET, ORALLY DISINTEGRATING ORAL EVERY 8 HOURS PRN
Qty: 30 TABLET | Refills: 0 | Status: SHIPPED | OUTPATIENT
Start: 2023-11-30 | End: 2023-12-11 | Stop reason: ALTCHOICE

## 2023-11-30 RX ORDER — OMEPRAZOLE 40 MG/1
40 CAPSULE, DELAYED RELEASE ORAL
Qty: 30 CAPSULE | Refills: 0 | Status: SHIPPED | OUTPATIENT
Start: 2023-11-30 | End: 2024-05-10

## 2023-11-30 NOTE — TELEPHONE ENCOUNTER
Patient complaining of nausea and stomach pain for 1 week , she want to know if you can send something to pharmacy to help

## 2023-12-11 ENCOUNTER — LAB (OUTPATIENT)
Dept: LAB | Facility: LAB | Age: 73
End: 2023-12-11
Payer: MEDICARE

## 2023-12-11 ENCOUNTER — OFFICE VISIT (OUTPATIENT)
Dept: PRIMARY CARE | Facility: CLINIC | Age: 73
End: 2023-12-11
Payer: MEDICARE

## 2023-12-11 VITALS
HEIGHT: 62 IN | BODY MASS INDEX: 45.82 KG/M2 | SYSTOLIC BLOOD PRESSURE: 128 MMHG | DIASTOLIC BLOOD PRESSURE: 72 MMHG | WEIGHT: 249 LBS

## 2023-12-11 DIAGNOSIS — E78.5 DYSLIPIDEMIA: ICD-10-CM

## 2023-12-11 DIAGNOSIS — R73.9 BORDERLINE HYPERGLYCEMIA: ICD-10-CM

## 2023-12-11 DIAGNOSIS — E55.9 VITAMIN D DEFICIENCY: ICD-10-CM

## 2023-12-11 DIAGNOSIS — Z00.00 ROUTINE GENERAL MEDICAL EXAMINATION AT A HEALTH CARE FACILITY: ICD-10-CM

## 2023-12-11 DIAGNOSIS — R79.89 ABNORMAL TSH: ICD-10-CM

## 2023-12-11 DIAGNOSIS — I10 PRIMARY HYPERTENSION: Primary | ICD-10-CM

## 2023-12-11 DIAGNOSIS — I10 PRIMARY HYPERTENSION: ICD-10-CM

## 2023-12-11 LAB
ALBUMIN SERPL BCP-MCNC: 4.2 G/DL (ref 3.4–5)
ALP SERPL-CCNC: 98 U/L (ref 33–136)
ALT SERPL W P-5'-P-CCNC: 17 U/L (ref 7–45)
ANION GAP SERPL CALC-SCNC: 13 MMOL/L (ref 10–20)
AST SERPL W P-5'-P-CCNC: 24 U/L (ref 9–39)
BASOPHILS # BLD AUTO: 0.03 X10*3/UL (ref 0–0.1)
BASOPHILS NFR BLD AUTO: 0.5 %
BILIRUB SERPL-MCNC: 0.3 MG/DL (ref 0–1.2)
BUN SERPL-MCNC: 14 MG/DL (ref 6–23)
CALCIUM SERPL-MCNC: 9.7 MG/DL (ref 8.6–10.6)
CHLORIDE SERPL-SCNC: 107 MMOL/L (ref 98–107)
CHOLEST SERPL-MCNC: 205 MG/DL (ref 0–199)
CHOLESTEROL/HDL RATIO: 3.6
CO2 SERPL-SCNC: 27 MMOL/L (ref 21–32)
CREAT SERPL-MCNC: 0.72 MG/DL (ref 0.5–1.05)
EOSINOPHIL # BLD AUTO: 0.07 X10*3/UL (ref 0–0.4)
EOSINOPHIL NFR BLD AUTO: 1.2 %
ERYTHROCYTE [DISTWIDTH] IN BLOOD BY AUTOMATED COUNT: 13.2 % (ref 11.5–14.5)
EST. AVERAGE GLUCOSE BLD GHB EST-MCNC: 100 MG/DL
GFR SERPL CREATININE-BSD FRML MDRD: 87 ML/MIN/1.73M*2
GLUCOSE SERPL-MCNC: 64 MG/DL (ref 74–99)
HBA1C MFR BLD: 5.1 %
HCT VFR BLD AUTO: 45.1 % (ref 36–46)
HDLC SERPL-MCNC: 57.4 MG/DL
HGB BLD-MCNC: 14.7 G/DL (ref 12–16)
IMM GRANULOCYTES # BLD AUTO: 0.02 X10*3/UL (ref 0–0.5)
IMM GRANULOCYTES NFR BLD AUTO: 0.3 % (ref 0–0.9)
LDLC SERPL CALC-MCNC: 128 MG/DL
LYMPHOCYTES # BLD AUTO: 1.4 X10*3/UL (ref 0.8–3)
LYMPHOCYTES NFR BLD AUTO: 24 %
MCH RBC QN AUTO: 31.3 PG (ref 26–34)
MCHC RBC AUTO-ENTMCNC: 32.6 G/DL (ref 32–36)
MCV RBC AUTO: 96 FL (ref 80–100)
MONOCYTES # BLD AUTO: 0.57 X10*3/UL (ref 0.05–0.8)
MONOCYTES NFR BLD AUTO: 9.8 %
NEUTROPHILS # BLD AUTO: 3.74 X10*3/UL (ref 1.6–5.5)
NEUTROPHILS NFR BLD AUTO: 64.2 %
NON HDL CHOLESTEROL: 148 MG/DL (ref 0–149)
NRBC BLD-RTO: 0 /100 WBCS (ref 0–0)
PLATELET # BLD AUTO: 210 X10*3/UL (ref 150–450)
POTASSIUM SERPL-SCNC: 4.2 MMOL/L (ref 3.5–5.3)
PROT SERPL-MCNC: 7.3 G/DL (ref 6.4–8.2)
RBC # BLD AUTO: 4.7 X10*6/UL (ref 4–5.2)
SODIUM SERPL-SCNC: 143 MMOL/L (ref 136–145)
TRIGL SERPL-MCNC: 96 MG/DL (ref 0–149)
VLDL: 19 MG/DL (ref 0–40)
WBC # BLD AUTO: 5.8 X10*3/UL (ref 4.4–11.3)

## 2023-12-11 PROCEDURE — 1126F AMNT PAIN NOTED NONE PRSNT: CPT | Performed by: INTERNAL MEDICINE

## 2023-12-11 PROCEDURE — 99214 OFFICE O/P EST MOD 30 MIN: CPT | Performed by: INTERNAL MEDICINE

## 2023-12-11 PROCEDURE — 80053 COMPREHEN METABOLIC PANEL: CPT

## 2023-12-11 PROCEDURE — 85025 COMPLETE CBC W/AUTO DIFF WBC: CPT

## 2023-12-11 PROCEDURE — 3074F SYST BP LT 130 MM HG: CPT | Performed by: INTERNAL MEDICINE

## 2023-12-11 PROCEDURE — 3078F DIAST BP <80 MM HG: CPT | Performed by: INTERNAL MEDICINE

## 2023-12-11 PROCEDURE — 80061 LIPID PANEL: CPT

## 2023-12-11 PROCEDURE — 82306 VITAMIN D 25 HYDROXY: CPT

## 2023-12-11 PROCEDURE — G0439 PPPS, SUBSEQ VISIT: HCPCS | Performed by: INTERNAL MEDICINE

## 2023-12-11 PROCEDURE — 84443 ASSAY THYROID STIM HORMONE: CPT

## 2023-12-11 PROCEDURE — 1160F RVW MEDS BY RX/DR IN RCRD: CPT | Performed by: INTERNAL MEDICINE

## 2023-12-11 PROCEDURE — 36415 COLL VENOUS BLD VENIPUNCTURE: CPT

## 2023-12-11 PROCEDURE — 1036F TOBACCO NON-USER: CPT | Performed by: INTERNAL MEDICINE

## 2023-12-11 PROCEDURE — 1159F MED LIST DOCD IN RCRD: CPT | Performed by: INTERNAL MEDICINE

## 2023-12-11 PROCEDURE — 1170F FXNL STATUS ASSESSED: CPT | Performed by: INTERNAL MEDICINE

## 2023-12-11 PROCEDURE — 83036 HEMOGLOBIN GLYCOSYLATED A1C: CPT

## 2023-12-11 ASSESSMENT — ACTIVITIES OF DAILY LIVING (ADL)
GROCERY_SHOPPING: INDEPENDENT
MANAGING_FINANCES: INDEPENDENT
DOING_HOUSEWORK: INDEPENDENT
BATHING: INDEPENDENT
TAKING_MEDICATION: INDEPENDENT
DRESSING: INDEPENDENT

## 2023-12-11 ASSESSMENT — ENCOUNTER SYMPTOMS
DEPRESSION: 0
OCCASIONAL FEELINGS OF UNSTEADINESS: 0
LOSS OF SENSATION IN FEET: 0

## 2023-12-11 ASSESSMENT — PATIENT HEALTH QUESTIONNAIRE - PHQ9
1. LITTLE INTEREST OR PLEASURE IN DOING THINGS: NOT AT ALL
SUM OF ALL RESPONSES TO PHQ9 QUESTIONS 1 AND 2: 0
2. FEELING DOWN, DEPRESSED OR HOPELESS: NOT AT ALL

## 2023-12-11 NOTE — PROGRESS NOTES
"Chief complaint: 6 month follow up     Subjective  Overall reports she is doing well today. No specific health concerns she would like to discuss    Still dealing with chronic issues. 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 Has follow up in survivorship clinic coming up     Bladder implant has helped some. She still has \"weak\" bladder    She was having some stomach discomfort last week and taking omeprazole which helped.    Social hx:  Home: lives alone   No falls in the last 6 months  Tobacco: former smoker    Alcohol: none   Drugs: smokes marijuana     ROS:   - CONSTITUTIONAL: Denies weight loss, fever and chills.  - HEENT: Denies changes in vision and hearing.  - RESPIRATORY: Denies SOB and cough.  - CV: Denies palpitations and chest pain.  - GI: Denies abdominal pain, nausea, vomiting, diarrhea, melena, or hematochezia.  - : Denies dysuria and urinary frequency.  - MSK: Denies myalgia and joint pain.  - SKIN: Denies rash and pruritus.  - NEUROLOGICAL: Denies headache and syncope.  - PSYCHIATRIC: Denies recent changes in mood. Denies anxiety and depression     Objective:   Vitals:    12/11/23 1152   BP: 128/72      Vitals:    12/11/23 1152   Weight: 113 kg (249 lb)       Physical exam:   Gen: obese female, alert, conversant, in no acute distress. Has a cane with her   Head: NCAT  ENT: MMM  Neck: supple  CV: RRR, no murmurs   Pulm: diminished breath sounds due to body habitus, CTAB, no crackles or wheezes. No increased work of breathing  Abd: soft, non-tender, non distended  Ext: warm, no edema  Neuro: moves all extremities  Skin: no rashes or wounds    Assessment:  Drea Anne is a 73 y.o. year-old female who presents to primary care clinic for 6 month follow up.     Plan:   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      # BCA s/p rxn, XRT   -anatrazole   -5 year checkup " will be fall 2023     #CTS   -ctm     #HTN, well controlled  -amlo 10   -aten 50   -lisinopril 40      # OAB, incontinence  -s/p interstim device 10/2023  -FU with urogyne   -solifenacin      # Obesity   -lost 10 lbs since 2021; down to 251 as of May 2023     #DLD   -c/w simva      #Knee pain L>R, h/o L knee surgery post fall  -no h/o injxns   -will call if sxs worsen   -occ meloxicam      #HM   -tdap 2021   -pneumovax utd   -flu shot - declined today   -discussed shingrix; revisit as previously required $300 copay   -mammo 11/2023  -dexa 2020 normal; repeat now  -cscope 2020; repeat 5-10 years (aged out)       Patient seen and discussed with Dr. Brooks Jay MD   IM PGY-3

## 2023-12-12 ENCOUNTER — TELEPHONE (OUTPATIENT)
Dept: HEMATOLOGY/ONCOLOGY | Facility: HOSPITAL | Age: 73
End: 2023-12-12
Payer: MEDICARE

## 2023-12-12 LAB
25(OH)D3 SERPL-MCNC: 53 NG/ML (ref 30–100)
TSH SERPL-ACNC: 1.64 MIU/L (ref 0.44–3.98)

## 2023-12-18 NOTE — PROGRESS NOTES
BREAST SURGICAL ONCOLOGY FOLLOW UP     Subjective   Drea Anne is a 73 y.o. female presents today for follow up carcinoma of the left breast.   She is doing well and has no complaints or concerns    Treatment history:  stage IIA (T2 N0) left breast cancer diagnosed in November 2018.   Invasive lobular carcinoma, grade 2; ER >95%, [place] negative, HER-2 negative.   Oncotype score 12  Lumpectomy, sentinel lymph node biopsy (0/1) and radiation therapy.  She is currently on anastrozole (started 3/19)    Surgery: lumpectomy/ SLNB  Radiation: yes  Systemic therapy: anastrozole (March 2019)    Mammogram: Bilateral mammogram November 24, 2023 shows no evidence of malignancy.  Category 2.    Radiology review: All images and reports were personally reviewed and the findings discussed with the patient.     Review of Systems   Constitutional symptoms: Denies generalized fatigue.  Denies weight change, fevers/chills, difficulty sleeping   Eyes: Denies double vision, glaucoma, cataracts.  Ear/nose/throat/mouth: Denies hearing changes, sore throat, sinus problems.  Cardiovascular: No chest pain.  Denies irregular heartbeat.  Denies ankle swelling.  Respiratory: No wheezing, cough, or shortness of breath.  Gastrointestinal: No abdominal pain,  No nausea/vomiting.  No indigestion/heartburn.  No change in bowel habits.  No constipation or diarrhea.   Genitourinary: No urinary incontinence.  No urinary frequency.  No painful urination.  Musculoskeletal: No bone pain, no muscle pain, no joint pain.   Integumentary: No rash. No masses.  No changes in moles.  No easy bruising.  Neurological: No headaches.  No tremors. No numbness/tingling.  Psychiatric: No anxiety. No depression.  Endocrine: No excessive thirst.  Not too hot or too cold.  Not tired or fatigued.    Hematological/lymphatic: No swollen glands or blood clotting problems.  No bruising.    PHYSICAL EXAM:    General: Alert and oriented x 3.  Mood and affect are  appropriate.  Neck: supple, no masses, no cervical adenopathy.  Cardiovascular: no lower extremity edema.  Pulmonary: breathing non labored on room air.  GI: Abdomen soft, no masses. No hepatomegaly or splenomegaly.  Lymph nodes: No supraclavicular or axillary adenopathy bilaterally.  Musculoskeletal: Full range of motion in the upper extremities bilaterally.  Neuro: denies dizziness, tremors  Physical Exam  Chest:      Comments: Lymph node exam shows no cervical, supraclavicular, or axillary lymphadenopathy.  Breast exam shows symmetric breasts bilaterally with no skin changes, no dominant masses and no nipple discharge in either breast.          Assessment/Plan     stage IIA left breast cancer diagnosed in November 2018.   Invasive lobular carcinoma, grade 2; ER >95%, [place] negative, HER-2 negative.   Oncotype score 12  -[id number]    Clinical breast exam and mammogram today are normal.  There is no evidence of cancer recurrence.    Continue follow up with medical oncology as scheduled.  Continue anastrozole.  Continue yearly mammograms.    Will follow up with me if there are any breast concerns.   Talita Bryant MD

## 2023-12-19 ENCOUNTER — APPOINTMENT (OUTPATIENT)
Dept: HEMATOLOGY/ONCOLOGY | Facility: HOSPITAL | Age: 73
End: 2023-12-19
Payer: MEDICARE

## 2023-12-21 ENCOUNTER — TELEMEDICINE (OUTPATIENT)
Dept: OBSTETRICS AND GYNECOLOGY | Facility: CLINIC | Age: 73
End: 2023-12-21
Payer: MEDICARE

## 2023-12-21 DIAGNOSIS — R35.1 NOCTURIA: ICD-10-CM

## 2023-12-21 DIAGNOSIS — N32.81 OAB (OVERACTIVE BLADDER): Primary | ICD-10-CM

## 2023-12-21 PROCEDURE — 99443 PR PHYS/QHP TELEPHONE EVALUATION 21-30 MIN: CPT | Performed by: OBSTETRICS & GYNECOLOGY

## 2023-12-21 ASSESSMENT — ENCOUNTER SYMPTOMS
ENDOCRINE NEGATIVE: 1
GASTROINTESTINAL NEGATIVE: 1
CARDIOVASCULAR NEGATIVE: 1
CONSTITUTIONAL NEGATIVE: 1
RESPIRATORY NEGATIVE: 1
PSYCHIATRIC NEGATIVE: 1
FREQUENCY: 1
EYES NEGATIVE: 1
MUSCULOSKELETAL NEGATIVE: 1
NEUROLOGICAL NEGATIVE: 1

## 2023-12-21 NOTE — PROGRESS NOTES
[facility] Department of Urogynecology   Ya Stringer MD, MPH   [phone]    ASSESSMENT AND PLAN:   73 year old female with OAB refractory to anticholinergics and beta 3 agonists, s/p successful Axonics stage 2 on 10/2/2023. Comorbidities include: HTN, glaucoma, and hx of L breast cancer now AVERY and is on Arimidex.     Diagnoses:  #1 Overactive bladder    Plan:   1. OAB, nocturia, urinary frequency  - Patient had 50-75% improvement in her UUI and urinary frequency with PNE trial.   - 10/2/2023 procedure: Axonics full implant (SNM stage 2).   - She reports making adjustments to her SNM settings to manage her urinary symptoms at night > daytime OAB symptoms. The patient reports having improvement in her nocturia after this with nocturia occurring 2x/night in comparison to nocturia 4-5x per night prior to level adjustment on her SNM. Of note she changed the SNM settings to stay consistent throughout the day/night to help manage nocturia and daytime urinary frequency. Patient reportedly does not change the SNM level/settings in the morning upon waking.   - We informed her that the SNM still remains on even through the key fob disconnects to the SNM. We advised her that she does not need to turn the SNM off and on; the goal is to find a setting that optimizes her OAB symptom management and to leave it on that setting 24/7 without adjusting the level.   - We instructed her to set the SNM at a level that she feels sensation and notices urinary symptom improvement; then to not make anymore adjustments with the SNM remote/key fob.   - Advised her to RTC to evaluate if she has a UTI given her symptoms of nocturia/urinary frequency. We will plan to have Tabitha (Ax[employer] rep) to be at her appointment to check her SNM settings and make program adjustments if needed.     Follow up in 2 weeks or sooner prn with Dr. Ya Stringer for SNM adjustments and UTI work up.     Phone call visit today: The patient's identity  was confirmed and that she is located in [place].   Ya Stringer MD MPH identified herself and the patient consented to a telehealth visit today. Telephone visit time: 12 minutes    Scribe Attestation  By signing my name below, I, Rajinder Dobsonkins, Juanibe, attest that this documentation has been prepared under the direction and in the presence of Ya Stringer MD MPH on 12/21/2023 at 1:05 PM.     Problem List Items Addressed This Visit    None     I spent a total of 24 minutes in face to face and non face to face time.      Ya Stringer MD, MPH, FACOG     Established    HISTORY OF PRESENT ILLNESS:   73 year old female presenting in telephone visit follow up for OAB s/p Axonics SNM stage 2 procedure on 10/2/2023.     Record Review:   - 10/20/2023 Dr. Ya Stringer note reviewed: OAB - Patient is s/p Axonics SNM (stage 2) implanted on 10/2/2023. She is pleased with the implant and had 50-75% improvement in her urinary symptoms. Released from all postoperative restrictions. Follow up in 2 months.   - 10/2/2023 procedure: Axonics full implant (SNM stage 2).      Urinary Symptoms:   - The patient spoke with Tabitha ([employer] representative) and she was informed to use the tamyca remote control to adjust her SNM settings.   - She reports making adjustments to her SNM settings to manage her urinary symptoms at night > daytime OAB symptoms. The patient reports having improvement in her nocturia after this with nocturia occurring 2x/night in comparison to nocturia 4-5x per night prior to level adjustment on her SNM. Of note she changed the SNM settings to stay consistent throughout the day/night and reportedly does not change the SNM level/settings in the morning upon waking.   - Reports some increased urinary frequency during the day even after increasing the level of her SNM around x2 weeks ago.   - She notes that her SNM key fob sometimes turns off (I.e. does not see lights flashing on the remote control) but we informed  her that the SNM still remains on even through the fob disconnects to the SNM. We advised her that she does not need to turn the SNM off and on; the goal is to find a setting that optimizes her OAB symptom management and to leave it on that setting 24/7 without adjusting the level.     Past Medical History:     Past Medical History:   Diagnosis Date    Cancer (CMS/HCC)     Glaucoma     Hyperlipidemia     Hypertension     Other specified health status     No pertinent past medical history    Personal history of malignant neoplasm, unspecified     History of malignant neoplasm    Personal history of other diseases of the musculoskeletal system and connective tissue     History of arthritis    Personal history of other diseases of urinary system     History of bladder problems    Personal history of other endocrine, nutritional and metabolic disease     History of high cholesterol    Personal history of other specified conditions 09/08/2016    History of dysuria    Personal history of other specified conditions 12/11/2018    History of breast lump    Personal history of other specified conditions 11/06/2018    History of abnormal mammogram    Unspecified abnormal findings in urine 08/31/2016    Abnormal finding in urine       Past Surgical History:     Past Surgical History:   Procedure Laterality Date    KNEE ARTHROPLASTY      OTHER SURGICAL HISTORY  11/21/2019    lt lumpesctomy radiation       Medications:     Prior to Admission medications    Medication Sig Start Date End Date Taking? Authorizing Provider   acetaminophen (Tylenol Extra Strength) 500 mg tablet Take 2 tablets (1,000 mg) by mouth every 6 hours if needed for mild pain (1 - 3). 10/2/23   Ya Stringer MD MPH   amLODIPine (Norvasc) 10 mg tablet Take 1 tablet (10 mg) by mouth once daily. 11/22/23 11/21/24  Valarie Ramos MD   anastrozole (Arimidex) 1 mg tablet Take 1 tablet (1 mg total) by mouth once daily. 11/22/23 11/21/24  Valarie MCDANIELS  MD Rachel   atenolol (Tenormin) 50 mg tablet Take 1 tablet (50 mg) by mouth once daily. 11/22/23 11/21/24  Valarie Ramos MD   brimonidine (AlphaGAN) 0.2 % ophthalmic solution Administer 1 drop into both eyes 2 times a day. 11/22/23 11/21/24  Valarie Ramos MD   calcium carbonate-vit D3-min 600 mg calcium- 400 unit tablet Take 1 tablet by mouth once daily. 4/1/19   Historical Provider, MD   diclofenac sodium 1 % kit Apply 1 g topically 2 times a day. 5/11/22   Historical Provider, MD   dorzolamide-timoloL (Cosopt) 22.3-6.8 mg/mL ophthalmic solution Administer 1 drop into both eyes 2 times a day. 11/22/23 11/21/24  Valarie Ramos MD   fluticasone (Flonase) 50 mcg/actuation nasal spray Administer 2 sprays into affected nostril(s) once daily. 8/3/20   Historical Provider, MD   ibuprofen 600 mg tablet Take 1 tablet (600 mg) by mouth 3 times a day as needed for mild pain (1 - 3) (pain). 10/2/23   Ya Stringer MD MPH   latanoprost (Xalatan) 0.005 % ophthalmic solution Administer 1 drop into both eyes once daily at bedtime. 11/22/23   Valarie Ramos MD   lisinopril 40 mg tablet Take 1 tablet (40 mg) by mouth once daily. 11/22/23 11/21/24  Valarie Ramos MD   meloxicam (Mobic) 15 mg tablet Take 0.5 tablets (7.5 mg) by mouth once daily as needed for moderate pain (4 - 6). 8/22/17   Historical Provider, MD   omeprazole (PriLOSEC) 40 mg DR capsule Take 1 capsule (40 mg) by mouth once daily in the morning. Take before meals. Do not crush or chew. 11/30/23 12/30/23  Valarie Ramos MD   simvastatin (Zocor) 10 mg tablet Take 1 tablet (10 mg) by mouth once daily at bedtime. 11/22/23 11/21/24  Valarie Ramos MD   simvastatin (Zocor) 20 mg tablet Take 1 tablet (20 mg) by mouth once daily. 11/20/23   Valarie Ramos MD   solifenacin (VESIcare) 10 mg tablet Take 1 tablet (10 mg) by mouth once daily. 1/19/21   Historical Provider, MD         ROS  Review of Systems   Constitutional: Negative.    HENT: Negative.     Eyes: Negative.    Respiratory: Negative.     Cardiovascular: Negative.    Gastrointestinal: Negative.    Endocrine: Negative.    Genitourinary:  Positive for frequency.   Musculoskeletal: Negative.    Neurological: Negative.    Psychiatric/Behavioral: Negative.            Data and DIAGNOSTIC STUDIES REVIEWED   BI mammo bilateral screening tomosynthesis    Result Date: 11/27/2023  Interpreted By:  Yloette Romeo, STUDY: BI MAMMO BILATERAL SCREENING TOMOSYNTHESIS;  11/24/2023 9:33 am   ACCESSION NUMBER(S): [id number]   ORDERING CLINICIAN: ARMANDO MCCLELLAN   INDICATION: Screening.  Status post left lumpectomy and radiation   COMPARISON: 09/12/2022 09/10/2021   FINDINGS: 2D and tomosynthesis images were reviewed at 1 mm slice thickness.   Density:  There are areas of scattered fibroglandular tissue.   Post treatment changes are again seen in the left breast. No suspicious masses or calcifications are identified.       No mammographic evidence of malignancy.     BI-RADS CATEGORY: BI-RADS Category:  2 Benign. Recommendation:  Routine Screening Mammogram in 1 Year. Recommended Date:  1 Year. Laterality:  Bilateral.   For any future breast imaging appointments, please call [phone]).     MACRO: None   Signed by: Yolette Romeo 11/27/2023 2:43 PM Dictation workstation:   [id number]    XR DEXA bone density    Result Date: 11/24/2023  Interpreted By:  Boubacar Mix, STUDY: DEXA BONE [id number]/24/2023 10:09 am   INDICATION: Signs/Symptoms: screening. The patient is a 74 y/o  year old F.   COMPARISON: Most recent prior is from 11/04/2020..   ACCESSION NUMBER(S): [id number]   ORDERING CLINICIAN: DESIREE GAUTHIER   TECHNIQUE: DEXA BONE DENSITY   FINDINGS: SPINE L1-L4 Bone Mineral Density: 1.29 T-Score 2.2  Z-Score 4.7 Bone Mineral Density change vs baseline:  3.3 Bone Mineral Density change vs previous: 0.4   LEFT FEMUR -TOTAL Bone  "Mineral Density: 0.917 T-Score -0.2   Z-Score  1.6 Bone Mineral Density change vs baseline: 10.7 Bone Mineral Density change vs previous: -7.6   LEFT FEMUR -NECK Bone Mineral Density: 0.709 T-Score -1.3  Z-Score 0.9     World Health Organization (WHO) criteria for post-menopausal,  Women: Normal:         T-score at or above -1 SD Osteopenia:   T-score between -1 and -2.5 SD Osteoporosis: T-score at or below -2.5 SD     10-year Fracture Risk: Major Osteoporotic Fracture  8.9 Hip Fracture                        1.4   Note:  If no FRAX score is reported, it is because: Some T-score for Spine Total or Hip Total or Femoral Neck at or below -2.5   This exam was performed at [facility] on a [employer] [facility] Dexa Unit.       DEXA:  According to World Health Organization criteria, classification is low bone mass (osteopenia)   Followup recommended in two years or sooner as clinically warranted.   All images and detailed analysis are available on the  Radiology PACS.   MACRO: None   Signed by: Boubacar Mix 11/24/2023 10:33 AM Dictation workstation:   [id number]     No results found for: \"URINECULTURE\", \"UAMICCOMM\"   Lab Results   Component Value Date    GLUCOSE 64 (L) 12/11/2023    CALCIUM 9.7 12/11/2023     12/11/2023    K 4.2 12/11/2023    CO2 27 12/11/2023     12/11/2023    BUN 14 12/11/2023    CREATININE 0.72 12/11/2023     Lab Results   Component Value Date    WBC 5.8 12/11/2023    HGB 14.7 12/11/2023    HCT 45.1 12/11/2023    MCV 96 12/11/2023     12/11/2023     "

## 2023-12-22 ENCOUNTER — OFFICE VISIT (OUTPATIENT)
Dept: SURGICAL ONCOLOGY | Facility: CLINIC | Age: 73
End: 2023-12-22
Payer: MEDICARE

## 2023-12-22 VITALS
HEART RATE: 69 BPM | SYSTOLIC BLOOD PRESSURE: 122 MMHG | DIASTOLIC BLOOD PRESSURE: 60 MMHG | WEIGHT: 249 LBS | BODY MASS INDEX: 45.82 KG/M2 | HEIGHT: 62 IN

## 2023-12-22 DIAGNOSIS — Z17.0 MALIGNANT NEOPLASM OF LEFT BREAST IN FEMALE, ESTROGEN RECEPTOR POSITIVE, UNSPECIFIED SITE OF BREAST (MULTI): Primary | ICD-10-CM

## 2023-12-22 DIAGNOSIS — C50.912 MALIGNANT NEOPLASM OF LEFT BREAST IN FEMALE, ESTROGEN RECEPTOR POSITIVE, UNSPECIFIED SITE OF BREAST (MULTI): Primary | ICD-10-CM

## 2023-12-22 PROCEDURE — 3074F SYST BP LT 130 MM HG: CPT | Performed by: SURGERY

## 2023-12-22 PROCEDURE — 99213 OFFICE O/P EST LOW 20 MIN: CPT | Performed by: SURGERY

## 2023-12-22 PROCEDURE — 1159F MED LIST DOCD IN RCRD: CPT | Performed by: SURGERY

## 2023-12-22 PROCEDURE — 1126F AMNT PAIN NOTED NONE PRSNT: CPT | Performed by: SURGERY

## 2023-12-22 PROCEDURE — 3078F DIAST BP <80 MM HG: CPT | Performed by: SURGERY

## 2023-12-22 PROCEDURE — 1160F RVW MEDS BY RX/DR IN RCRD: CPT | Performed by: SURGERY

## 2023-12-22 PROCEDURE — 1036F TOBACCO NON-USER: CPT | Performed by: SURGERY

## 2023-12-22 ASSESSMENT — PAIN SCALES - GENERAL: PAINLEVEL: 0-NO PAIN

## 2024-01-19 ENCOUNTER — APPOINTMENT (OUTPATIENT)
Dept: OBSTETRICS AND GYNECOLOGY | Facility: CLINIC | Age: 74
End: 2024-01-19
Payer: MEDICARE

## 2024-02-13 ENCOUNTER — OFFICE VISIT (OUTPATIENT)
Dept: HEMATOLOGY/ONCOLOGY | Facility: HOSPITAL | Age: 74
End: 2024-02-13
Payer: MEDICARE

## 2024-02-13 VITALS
HEART RATE: 63 BPM | DIASTOLIC BLOOD PRESSURE: 74 MMHG | WEIGHT: 252.21 LBS | SYSTOLIC BLOOD PRESSURE: 134 MMHG | OXYGEN SATURATION: 96 % | HEIGHT: 62 IN | TEMPERATURE: 97.9 F | BODY MASS INDEX: 46.41 KG/M2 | RESPIRATION RATE: 20 BRPM

## 2024-02-13 DIAGNOSIS — Z17.0 MALIGNANT NEOPLASM OF UPPER-OUTER QUADRANT OF LEFT BREAST IN FEMALE, ESTROGEN RECEPTOR POSITIVE (MULTI): Primary | ICD-10-CM

## 2024-02-13 DIAGNOSIS — Z79.811 ENCOUNTER FOR MONITORING AROMATASE INHIBITOR THERAPY: ICD-10-CM

## 2024-02-13 DIAGNOSIS — C50.412 MALIGNANT NEOPLASM OF UPPER-OUTER QUADRANT OF LEFT BREAST IN FEMALE, ESTROGEN RECEPTOR POSITIVE (MULTI): Primary | ICD-10-CM

## 2024-02-13 DIAGNOSIS — Z51.81 ENCOUNTER FOR MONITORING AROMATASE INHIBITOR THERAPY: ICD-10-CM

## 2024-02-13 PROCEDURE — 99214 OFFICE O/P EST MOD 30 MIN: CPT | Performed by: NURSE PRACTITIONER

## 2024-02-13 PROCEDURE — 1159F MED LIST DOCD IN RCRD: CPT | Performed by: NURSE PRACTITIONER

## 2024-02-13 PROCEDURE — 1036F TOBACCO NON-USER: CPT | Performed by: NURSE PRACTITIONER

## 2024-02-13 PROCEDURE — 1125F AMNT PAIN NOTED PAIN PRSNT: CPT | Performed by: NURSE PRACTITIONER

## 2024-02-13 PROCEDURE — 99204 OFFICE O/P NEW MOD 45 MIN: CPT | Performed by: NURSE PRACTITIONER

## 2024-02-13 PROCEDURE — 3078F DIAST BP <80 MM HG: CPT | Performed by: NURSE PRACTITIONER

## 2024-02-13 PROCEDURE — 3075F SYST BP GE 130 - 139MM HG: CPT | Performed by: NURSE PRACTITIONER

## 2024-02-13 ASSESSMENT — PAIN SCALES - GENERAL: PAINLEVEL: 4

## 2024-02-13 NOTE — PROGRESS NOTES
Oncology Follow-Up    Drea Anne  [id number]        Cancer Staging   Breast cancer, left (CMS/HCC)  Staging form: Breast, AJCC 8th Edition  - Pathologic stage from 11/1/2018: Stage IIA (pT2, pN0(sn), cM0, G2, ER+, [place]-, HER2-, Oncotype DX score: 12) - Unsigned    Oncology History    No history exists.   Cancer History:   Treatment Synopsis:    1. Status post left lumpectomy and SLND for a multi-focal ILC, grade 2, with invasive components measuring 2.1cm, 1.5mm and 0.75mm, respectively;  0/1 SLN.  2. Oncotype recurrence score 12 (low recurrence risk).  3. Status post adjuvant XRT to left breast.   4. Started on anastrozole on 3/27/19 and remains on anastrozole.       Subjective    Drea presents for her Routine follow up visit. She rates her eneryg level as 5/10 and reports no distress. She is exercising though not as much as she was previous. She has lost 20# intentionally though gained 3# back. Drea had an axionic implant placed for her weak bladder. She has stopped drinking water after 4:00 p.m.. Drea states that if she drinks water she has to urinate very frequently. If she drinks other beverages, this does not happen. The implant hasn't helped a lot she states. Drea has an upcoming appointment to address this. Drea continues on anastrozole daily with good tolerance. Drea denies any unusual headaches, balance issues, depression, cough, shortness of breath, problems swallowing, changes in chest/breast area, abdominal pain, bone or muscle pain, vaginal bleeding, rectal bleeding, blood in the urine, vaginal dryness, swelling arms or legs, new or unusual skin moles or lesions.     Objective      Vitals:    02/13/24 0852   BP: 134/74   Pulse: 63   Resp: 20   Temp: 36.6 °C (97.9 °F)   SpO2: 96%        Constitutional: Well developed, alert/oriented x3, no distress, cooperative   Eyes: clear sclera   ENMT: mucous membranes moist, no apparent lesions   Head/Neck: Neck supple, no bruits   Respiratory/Thorax: Patent airways,  normal breath sounds with good chest expansion   Cardiovascular: Regular rate, irregular rhythm, no murmur   Gastrointestinal: Nondistended, soft, non-tender, no masses palpable, no organomegaly   Musculoskeletal: ROM intact, no joint swelling, normal strength   Extremities: normal extremities, no edema, cyanosis, contusions or wounds   Neurological: alert and oriented x3,  normal strength   Breast:     Lymphatic: No significant lymphadenopathy   Psychological: Appropriate mood and behavior   Skin: Warm and dry, no lesions, no rashes      Physical Exam  Chest:          Comments: Left breast + for breast conserving surgery with well healed UOQ and left axillary incisions; no masses, nodules, skin changes, discharge. Right breast without masses, nodules, skin changes, discharge. Scattered breast tissue bilaterally.         Lab Results   Component Value Date    WBC 5.8 12/11/2023    HGB 14.7 12/11/2023    HCT 45.1 12/11/2023    MCV 96 12/11/2023     12/11/2023       Chemistry    Lab Results   Component Value Date/Time     12/11/2023 1241    K 4.2 12/11/2023 1241     12/11/2023 1241    CO2 27 12/11/2023 1241    BUN 14 12/11/2023 1241    CREATININE 0.72 12/11/2023 1241    Lab Results   Component Value Date/Time    CALCIUM 9.7 12/11/2023 1241    ALKPHOS 98 12/11/2023 1241    AST 24 12/11/2023 1241    ALT 17 12/11/2023 1241    BILITOT 0.3 12/11/2023 1241         Imaging:    Narrative & Impression   Interpreted By:  Yolette Romeo,   STUDY:  BI MAMMO BILATERAL SCREENING TOMOSYNTHESIS;  11/24/2023 9:33 am      ACCESSION NUMBER(S):  [id number]      ORDERING CLINICIAN:  ARMANDO MCCLELLAN      INDICATION:  Screening.  Status post left lumpectomy and radiation      COMPARISON:  09/12/2022 09/10/2021      FINDINGS:  2D and tomosynthesis images were reviewed at 1 mm slice thickness.      Density:  There are areas of scattered fibroglandular tissue.      Post treatment changes are again seen in the left breast.  No  suspicious masses or calcifications are identified.      IMPRESSION:  No mammographic evidence of malignancy.          BI-RADS CATEGORY:  BI-RADS Category:  2 Benign.  Recommendation:  Routine Screening Mammogram in 1 Year.  Recommended Date:  1 Year.     Assessment/Plan    Drea is a 74 you woman with a hx of T2N0 multi-focal ILC diagnosed in November 2019. She is s/p partial mastectomy, XRT, and is currently on anastrozole with good tolerance. There is no evidence of recurrent disease on today's exam.   Plan:  Exam is negative.  Discussed 5 vs 10 years of anastrozole. Options are to stop at 5 years, or continue on for 7 years, or continue on for 10 years, or have Breast Cancer Index testing. I suggested we take this on a year to year basis. Drea preferred testing. I will call her with those results in 3-4 weeks.  Encouraged monthly breast self exams, plant based diet, keep alcohol <3 drinks/week, exercise at least 2.5 hours/week.  We reviewed signs/symptoms of recurrence including new masses, new pigmented lesion, tugging or pulling of the skin, nipple discharge, rash in or around the chest area, or any new finding that doesn't resolve within a 2-3 weeks.  All of Drea's questions/concerns were addressed.  Over 30 minutes of time was spent with this patient with >50% of the time with education, counseling, and coordination of care.   Drea will have her mammogram in late November at our [address] location.  I will see her back in one year. She will call with any concerns.    Diagnoses and all orders for this visit:  Malignant neoplasm of upper-outer quadrant of left breast in female, estrogen receptor positive (CMS/HCC)  -     Clinic Appointment Request Follow Up; ARMANDO MCCLELLAN; Future  -     BI mammo bilateral screening tomosynthesis; Future  Encounter for monitoring aromatase inhibitor therapy    Colon and vaccines up to date.     KELLEY Barney-CNP

## 2024-02-13 NOTE — PATIENT INSTRUCTIONS
1. Exercise 2.5 hours per week; bone strengthening, cardio-vascular, resistance training.  2. Please do self breast exams monthly.  3. Keep alcohol under 3 drinks per week.  4. Sun safety - limit sun exposure from 11a-2p when its at its hottest, apply 15-30 sun block and re-apply every 1-2 hours if perspiring or swimming.  5. Eat a plant based diet, add in oily fishes such as mackerel, tuna, and salmon.  6. Get in at least 1,000 mg of calcium per day through diet or supplement for bone strength. Examples of foods higher in calcium are milk, yogurt, fruited yogurt, oranges, fortified orange juice, almonds, almond milk, broccoli, spinach, bok lei, mustard greens, puddings, custards, ice cream, fortified cereals, bars, and crackers.   7. Continue anastrozole 1mg daily. I will call you in 3-4 weeks with the results of the Breast Cancer Index testing. This will determine if you can come off of anastrozole after 5 years (March) or would benefit with extended therapy.  8. Please call the office if any new mass or rash in or around breast, or any uncontrolled symptoms that last over 2-3 weeks at [phone].  9. Your exam is negative.   10. I placed an order for your next mammogram which will be due in late November at our [address] location.  10. It was nice seeing you today, [place]. I will see you back in one year. Please call with any concerns.  Thank you for choosing [facility] for your care.

## 2024-02-22 ENCOUNTER — OFFICE VISIT (OUTPATIENT)
Dept: OBSTETRICS AND GYNECOLOGY | Facility: CLINIC | Age: 74
End: 2024-02-22
Payer: MEDICARE

## 2024-02-22 VITALS
HEART RATE: 67 BPM | WEIGHT: 252 LBS | BODY MASS INDEX: 46.08 KG/M2 | SYSTOLIC BLOOD PRESSURE: 128 MMHG | DIASTOLIC BLOOD PRESSURE: 68 MMHG

## 2024-02-22 DIAGNOSIS — N39.41 URGE INCONTINENCE OF URINE: ICD-10-CM

## 2024-02-22 DIAGNOSIS — Z96.82 SACRAL NERVE STIMULATOR PRESENT: Primary | ICD-10-CM

## 2024-02-22 DIAGNOSIS — N32.81 OAB (OVERACTIVE BLADDER): ICD-10-CM

## 2024-02-22 LAB
POC APPEARANCE, URINE: CLEAR
POC BILIRUBIN, URINE: NEGATIVE
POC BLOOD, URINE: NEGATIVE
POC COLOR, URINE: YELLOW
POC GLUCOSE, URINE: NEGATIVE MG/DL
POC KETONES, URINE: NEGATIVE MG/DL
POC LEUKOCYTES, URINE: ABNORMAL
POC NITRITE,URINE: NEGATIVE
POC PH, URINE: 5.5 PH
POC PROTEIN, URINE: ABNORMAL MG/DL
POC SPECIFIC GRAVITY, URINE: >=1.03
POC UROBILINOGEN, URINE: 0.2 EU/DL

## 2024-02-22 PROCEDURE — 3074F SYST BP LT 130 MM HG: CPT | Performed by: OBSTETRICS & GYNECOLOGY

## 2024-02-22 PROCEDURE — 3078F DIAST BP <80 MM HG: CPT | Performed by: OBSTETRICS & GYNECOLOGY

## 2024-02-22 PROCEDURE — 1036F TOBACCO NON-USER: CPT | Performed by: OBSTETRICS & GYNECOLOGY

## 2024-02-22 PROCEDURE — 99214 OFFICE O/P EST MOD 30 MIN: CPT | Performed by: OBSTETRICS & GYNECOLOGY

## 2024-02-22 PROCEDURE — 1159F MED LIST DOCD IN RCRD: CPT | Performed by: OBSTETRICS & GYNECOLOGY

## 2024-02-22 PROCEDURE — 1125F AMNT PAIN NOTED PAIN PRSNT: CPT | Performed by: OBSTETRICS & GYNECOLOGY

## 2024-02-22 PROCEDURE — 81003 URINALYSIS AUTO W/O SCOPE: CPT | Mod: 91,QW | Performed by: OBSTETRICS & GYNECOLOGY

## 2024-02-22 ASSESSMENT — ENCOUNTER SYMPTOMS
PSYCHIATRIC NEGATIVE: 1
GASTROINTESTINAL NEGATIVE: 1
CONSTITUTIONAL NEGATIVE: 1
FREQUENCY: 1
MUSCULOSKELETAL NEGATIVE: 1
ENDOCRINE NEGATIVE: 1
EYES NEGATIVE: 1
CARDIOVASCULAR NEGATIVE: 1
RESPIRATORY NEGATIVE: 1
NEUROLOGICAL NEGATIVE: 1

## 2024-02-23 NOTE — PROGRESS NOTES
[facility] Department of Urogynecology   Ya Stringer MD, MPH   [phone]    ASSESSMENT AND PLAN:   73 year old female with OAB refractory to anticholinergics and beta 3 agonists, s/p successful Axonics stage 2 on 10/2/2023. Comorbidities include: HTN, glaucoma, and hx of L breast cancer now AVERY and is on Arimidex.      Diagnoses:  #1 Overactive bladder     Plan:   1. OAB, nocturia, urinary frequency  - POCT UA today showed trace protein and trace leukocytes.   - 10/2/2023 procedure: Axonics full implant (SNM stage 2).   - Discussed not limiting her fluid intake as this has proven to be more effective for her as the anxiety/fear of nocturia prevents her from drinking fluids throughout the day with excessive nocturia 20x/night vs. when she does not limit fluid intake and drinks whatever she pleases on the weekends with minimal nocturia 1-2x/night due to less anxiety.   - Reviewed having a total fluid intake of 60oz per day which includes Pepsi, Crystal Light, water, tea, juice, etc. collectively. We discussed that the artificial sweetener in Crystal Light and caffeine is a bladder irritant which could be worsening her frequency symptoms.   - We informed her that the SNM still remains on even through the key fob disconnects to the SNM. We advised her that she does not need to turn the SNM off and on; the goal is to find a setting that optimizes her OAB symptom management and to leave it on that setting 24/7 without adjusting the level.   - We again instructed her to set the SNM at a level that she feels sensation and notices urinary symptom improvement; then to not make anymore adjustments with the SNM remote/velásquez fob.   - Tabitha ([email or web address] rep) connected to the patient SNM device her amplitude was at 4 with the patient having pain at this elevated level on the SNM. The [employer] representative recalibrated her SNM device and the patient felt sensation at 2 amp and was advised to leave it at this level x2  weeks and evaluate her sensation/symptoms and only adjust the settings when she loses sensation or has worsening urinary symptoms.   - The patient will plan to switch from drinking Crystal Light to water daily, not worrying about her fluid intake as the anxiety has worsened her nocturia, and will keep her Axonics SNM at level 2 setting which is when she felt sensation today in clinic.     Follow up in 1 month with Dr. Ya Stringer.     Scribe Attestation  By signing my name below, I, Rajinder Mcnair, Juanibe, attest that this documentation has been prepared under the direction and in the presence of Ya Stringer MD MPH on 02/22/2024 at 10:17 PM.     Problem List Items Addressed This Visit          Genitourinary and Reproductive    OAB (overactive bladder)    Urinary incontinence    Relevant Orders    POCT UA Automated manually resulted (Completed)      I spent a total of 30 minutes in face to face and non face to face time.      Ya Stringer MD, MPH, FACOG     Established    HISTORY OF PRESENT ILLNESS:   74 year old female following up on OAB s/p Axonics SNM stage 2 procedure on 10/2/2023.     Record Review:   - 12/21/2023 Dr. Ya Stringer note: OAB - Patient is s/p Axonics SNM (stage 2) implanted on 10/2/2023. She could not tell her device was on. We instructed her to set the SNM at a level that she feels sensation and notices urinary symptom improvement; then to not make anymore adjustments with the SNM remote/key fob. Advised her to RTC to evaluate if she has a UTI given her symptoms of nocturia/urinary frequency. We will plan to have Tabitha (Axonics rep) to be at her appointment to check her SNM settings and make program adjustments if needed.   - 10/20/2023 Dr. Ya Stringer note reviewed: OAB - Patient is s/p Axonics SNM (stage 2) implanted on 10/2/2023. She is pleased with the implant and had 50-75% improvement in her urinary symptoms. Released from all postoperative restrictions. Follow up in 2 months.    - 10/2/2023 procedure: Axonics full implant (SNM stage 2).      Urinary Symptoms:   - The patient reports having pain in her rectum with positional changes (I.e. sitting down on hard chairs/surfaces, positions she laid in bed, sitting on the toilet ) that began 2 months ago when she increased her SNM setting at the site where the stimulation is; when Tabitha (Ax[employer] ) who is present at this appointment today, connected to her SNM device her amplitude was at 4. She recalibrated her SNM device and the patient felt sensation at 2.0 and was advised to leave it at this level for 2 weeks.   - Patient feels that her pain is related to the elevated level that the SNM was on vs. SNM site pain.   - Throughout the week she reports nocturia 20x per night but this is intermittent and does not occur every night. Of note, she monitors her fluid intake and drinks very little water when she has worsened nocturia. Then on the weekends she drinks Pepsi and ginger ale she does not have nocturia but 1-2x per night.   - She reports having a moderate amount of anxiety around being worried about her fluid intake which she thinks can be attributing to her worsening frequency at night throughout the week vs. her minimal nocturia on the weekends when she knows she is able to drinks whatever she wants more freely (I.e. soda pop and not restricting her fluid intake).   - She described a peculiar pattern of nocturia, experiencing up to 20 episodes of nocturia on some nights, despite monitoring fluid intake. Interestingly, this issue seems to gautam during weekends when she allows herself to consume beverages like Pepsi and ginger ale without restriction. Ms. Anne has been cautious about her fluid intake during the week, limiting herself to water or Crystal Light and avoiding drinking after 3pm to minimize nocturia. However, this strategy has not been effective.   - Patient is afraid of drinking too much water but only drinks 8oz of water  with her lunch due to the fear of being up all night using the bathroom.   - She reports drinking Crystal Light for the past several years prior to her OAB symptoms beginning.   - Has severe amount of anxiety surrounding her nocturia but is fearful to drink fluids as she thinks would worsen her nocturia.   - Denies feeling symptomatic of a UTI today; no painful/burning dysuria or bladder pain.     Past Medical History:     Past Medical History:   Diagnosis Date    Cancer (CMS/HCC)     Glaucoma     Hyperlipidemia     Hypertension     Other specified health status     No pertinent past medical history    Personal history of malignant neoplasm, unspecified     History of malignant neoplasm    Personal history of other diseases of the musculoskeletal system and connective tissue     History of arthritis    Personal history of other diseases of urinary system     History of bladder problems    Personal history of other endocrine, nutritional and metabolic disease     History of high cholesterol    Personal history of other specified conditions 09/08/2016    History of dysuria    Personal history of other specified conditions 12/11/2018    History of breast lump    Personal history of other specified conditions 11/06/2018    History of abnormal mammogram    Unspecified abnormal findings in urine 08/31/2016    Abnormal finding in urine       Past Surgical History:     Past Surgical History:   Procedure Laterality Date    KNEE ARTHROPLASTY      OTHER SURGICAL HISTORY  11/21/2019    lt lumpesctomy radiation       Medications:     Prior to Admission medications    Medication Sig Start Date End Date Taking? Authorizing Provider   acetaminophen (Tylenol Extra Strength) 500 mg tablet Take 2 tablets (1,000 mg) by mouth every 6 hours if needed for mild pain (1 - 3). 10/2/23   Ya Stringer MD MPH   amLODIPine (Norvasc) 10 mg tablet Take 1 tablet (10 mg) by mouth once daily. 11/22/23 11/21/24  Valarie Ramos MD    anastrozole (Arimidex) 1 mg tablet Take 1 tablet (1 mg total) by mouth once daily. 11/22/23 11/21/24  Valarie Ramos MD   atenolol (Tenormin) 50 mg tablet Take 1 tablet (50 mg) by mouth once daily. 11/22/23 11/21/24  Valarie Ramos MD   brimonidine (AlphaGAN) 0.2 % ophthalmic solution Administer 1 drop into both eyes 2 times a day. 11/22/23 11/21/24  Valarie Ramos MD   calcium carbonate-vit D3-min 600 mg calcium- 400 unit tablet Take 1 tablet by mouth once daily. 4/1/19   Historical Provider, MD   diclofenac sodium 1 % kit Apply 1 g topically 2 times a day. 5/11/22   Historical Provider, MD   dorzolamide-timoloL (Cosopt) 22.3-6.8 mg/mL ophthalmic solution Administer 1 drop into both eyes 2 times a day. 11/22/23 11/21/24  Valarie Ramos MD   fluticasone (Flonase) 50 mcg/actuation nasal spray Administer 2 sprays into affected nostril(s) once daily. 8/3/20   Historical Provider, MD   ibuprofen 600 mg tablet Take 1 tablet (600 mg) by mouth 3 times a day as needed for mild pain (1 - 3) (pain). 10/2/23   Ya Stringer MD MPH   latanoprost (Xalatan) 0.005 % ophthalmic solution Administer 1 drop into both eyes once daily at bedtime. 11/22/23   Valarie Ramos MD   lisinopril 40 mg tablet Take 1 tablet (40 mg) by mouth once daily. 11/22/23 11/21/24  Valarie Ramos MD   meloxicam (Mobic) 15 mg tablet Take 0.5 tablets (7.5 mg) by mouth once daily as needed for moderate pain (4 - 6). 8/22/17   Historical Provider, MD   omeprazole (PriLOSEC) 40 mg DR capsule Take 1 capsule (40 mg) by mouth once daily in the morning. Take before meals. Do not crush or chew. 11/30/23 12/30/23  Valarie Ramos MD   simvastatin (Zocor) 10 mg tablet Take 1 tablet (10 mg) by mouth once daily at bedtime. 11/22/23 11/21/24  Valarie Ramos MD   simvastatin (Zocor) 20 mg tablet Take 1 tablet (20 mg) by mouth once daily.  Patient not taking: Reported on 2/13/2024  "11/20/23   Valarie Ramos MD   solifenacin (VESIcare) 10 mg tablet Take 1 tablet (10 mg) by mouth once daily. 1/19/21   Historical Provider, MD MARIE  Review of Systems   Constitutional: Negative.    HENT: Negative.     Eyes: Negative.    Respiratory: Negative.     Cardiovascular: Negative.    Gastrointestinal: Negative.    Endocrine: Negative.    Genitourinary:  Positive for frequency.   Musculoskeletal: Negative.    Neurological: Negative.    Psychiatric/Behavioral: Negative.            PHYSICAL EXAM:    /68   Pulse 67   Wt 114 kg (252 lb)   BMI 46.08 kg/m²   No LMP recorded. Patient is postmenopausal.    Declines chaperone for physical exam.      Well developed, well nourished, in no apparent distress.   Neurologic/Psychiatric:  Awake, Alert and Oriented times 3.  Affect normal.         Data and DIAGNOSTIC STUDIES REVIEWED   No results found.   No results found for: \"URINECULTURE\", \"UAMICCOMM\"   Lab Results   Component Value Date    GLUCOSE 64 (L) 12/11/2023    CALCIUM 9.7 12/11/2023     12/11/2023    K 4.2 12/11/2023    CO2 27 12/11/2023     12/11/2023    BUN 14 12/11/2023    CREATININE 0.72 12/11/2023     Lab Results   Component Value Date    WBC 5.8 12/11/2023    HGB 14.7 12/11/2023    HCT 45.1 12/11/2023    MCV 96 12/11/2023     12/11/2023     "

## 2024-03-06 ENCOUNTER — APPOINTMENT (OUTPATIENT)
Dept: OBSTETRICS AND GYNECOLOGY | Facility: CLINIC | Age: 74
End: 2024-03-06
Payer: MEDICARE

## 2024-03-06 DIAGNOSIS — H40.9 GLAUCOMA, UNSPECIFIED GLAUCOMA TYPE, UNSPECIFIED LATERALITY: ICD-10-CM

## 2024-03-06 RX ORDER — BRIMONIDINE TARTRATE 2 MG/ML
1 SOLUTION/ DROPS OPHTHALMIC 2 TIMES DAILY
Qty: 15 ML | Refills: 0 | Status: SHIPPED | OUTPATIENT
Start: 2024-03-06 | End: 2024-05-10 | Stop reason: SDUPTHER

## 2024-03-28 ENCOUNTER — OFFICE VISIT (OUTPATIENT)
Dept: OBSTETRICS AND GYNECOLOGY | Facility: CLINIC | Age: 74
End: 2024-03-28
Payer: MEDICARE

## 2024-03-28 VITALS
HEIGHT: 62 IN | BODY MASS INDEX: 45.82 KG/M2 | SYSTOLIC BLOOD PRESSURE: 109 MMHG | DIASTOLIC BLOOD PRESSURE: 68 MMHG | WEIGHT: 249 LBS | HEART RATE: 95 BPM

## 2024-03-28 DIAGNOSIS — I10 HYPERTENSION, UNSPECIFIED TYPE: ICD-10-CM

## 2024-03-28 DIAGNOSIS — N32.81 OAB (OVERACTIVE BLADDER): Primary | ICD-10-CM

## 2024-03-28 DIAGNOSIS — E66.01 CLASS 3 SEVERE OBESITY IN ADULT, UNSPECIFIED BMI, UNSPECIFIED OBESITY TYPE, UNSPECIFIED WHETHER SERIOUS COMORBIDITY PRESENT (MULTI): ICD-10-CM

## 2024-03-28 DIAGNOSIS — R06.83 SNORING: ICD-10-CM

## 2024-03-28 PROCEDURE — 1159F MED LIST DOCD IN RCRD: CPT | Performed by: OBSTETRICS & GYNECOLOGY

## 2024-03-28 PROCEDURE — 1126F AMNT PAIN NOTED NONE PRSNT: CPT | Performed by: OBSTETRICS & GYNECOLOGY

## 2024-03-28 PROCEDURE — 3074F SYST BP LT 130 MM HG: CPT | Performed by: OBSTETRICS & GYNECOLOGY

## 2024-03-28 PROCEDURE — 99214 OFFICE O/P EST MOD 30 MIN: CPT | Performed by: OBSTETRICS & GYNECOLOGY

## 2024-03-28 PROCEDURE — 3078F DIAST BP <80 MM HG: CPT | Performed by: OBSTETRICS & GYNECOLOGY

## 2024-03-28 ASSESSMENT — ENCOUNTER SYMPTOMS
GASTROINTESTINAL NEGATIVE: 1
CONSTITUTIONAL NEGATIVE: 1
RESPIRATORY NEGATIVE: 1
FREQUENCY: 1
PSYCHIATRIC NEGATIVE: 1
CARDIOVASCULAR NEGATIVE: 1
MUSCULOSKELETAL NEGATIVE: 1
EYES NEGATIVE: 1
NEUROLOGICAL NEGATIVE: 1
ENDOCRINE NEGATIVE: 1

## 2024-03-28 ASSESSMENT — PAIN SCALES - GENERAL: PAINLEVEL: 0-NO PAIN

## 2024-03-28 NOTE — PROGRESS NOTES
[facility] Department of Urogynecology   Ya Stringer MD, MPH   [phone]    ASSESSMENT AND PLAN:   73 year old female with OAB refractory to anticholinergics and beta 3 agonists, s/p successful Axonics stage 2 on 10/2/2023. Comorbidities include: HTN, glaucoma, and hx of L breast cancer now AVERY and is on Arimidex.      Diagnoses:  #1 Overactive bladder  #2 Nocturia  #3 Snoring  #4 Hypertension     Plan:   1. OAB, nocturia,    - PVR = 9mL by bladder U/S.   - 10/2/2023 procedure: Axonics full implant (SNM stage 2).   - The patient feels that the SNM is moderately managing her OAB symptoms although she continues to have rare small volume UUI episodes throughout the week and persistent nocturia someimtes 2x/night, sometimes 6 times per night.   - we will reach out to Tabitha (Axonics rep) to help adjust the SNM settings    2. Concern for sleep apnea  - Reviewed symptoms of sleep apnea including snoring, daytime drowsiness, nocturia, and high blood pressure. We discussed that since she is snoring and has high blood pressure that she could have sleep apnea which can cause more nighttime wake ups and sleep apnea causes an increase in urine production secondary to increased atrial natriuretic peptide (ANP) hormone secretion. We reviewed the importance of treating INNA as it is correlated with HTN and cardiac strain. Given her symptoms and since she saw improvement with the PNE trial and we have tried optimizing her SNM device several times now, we recommend evaluation by sleep medicine for her potentially needing a CPAP to further reduce her nocturia.   - Referral placed with sleep medicine.     Follow up in 4 weeks with Dr. Ya Stringer. We will plan to coordinate with Tabitha to have her present at the next appointment.     Scribe Attestation  By signing my name below, Rajinder LOPEZ Scribe, attest that this documentation has been prepared under the direction and in the presence of Ya Stringer MD MPH on  03/28/2024 at 1:42 PM.     Problem List Items Addressed This Visit          Cardiac and Vasculature    Hypertension    Relevant Orders    Referral to Adult Sleep Medicine       Genitourinary and Reproductive    OAB (overactive bladder)    Relevant Orders    Measure post void residual (Completed)    Referral to Adult Sleep Medicine     Other Visit Diagnoses       Snoring    -  Primary    Relevant Orders    Referral to Adult Sleep Medicine           I spent a total of 30 minutes in face to face and non face to face time.      Ya Stringer MD, MPH, FACOG   I Dr. Stringer, personally performed the services described in the documentation as scribed in my presence and confirm it is both complete and accurate.  Ya Stringer MD, MPH, FACOG      Established    HISTORY OF PRESENT ILLNESS:   74 year old following up on OAB with SNM in place.     Record Review:   - 2/22/2024 Dr. Ya Stringer note RE: OAB - Patient is s/p Axonics SNM (stage 2) implanted on 10/2/2023. At this visit we reprogrammed her implant with Tabitha because the amplitude was at level 4 with the patient endorsing pain at this level. The SNM was recalibrated and the patient felt sensation at level 2 mAmp. We advised her to decrease drinking Crystal Light as this is a bladder irritant and gave her lifestyle changes.   - 12/21/2023 Dr. Ya Stringer note: OAB - Patient is s/p Axonics SNM (stage 2) implanted on 10/2/2023. She could not tell her device was on. We instructed her to set the SNM at a level that she feels sensation and notices urinary symptom improvement; then to not make anymore adjustments with the SNM remote/key fob. Advised her to RTC to evaluate if she has a UTI given her symptoms of nocturia/urinary frequency. We will plan to have Tabitha (Axonics rep) to be at her appointment to check her SNM settings and make program adjustments if needed.   - 10/20/2023 Dr. Ya Stringer note reviewed: OAB - Patient is s/p Axonics SNM (stage 2) implanted on  10/2/2023. She is pleased with the implant and had 50-75% improvement in her urinary symptoms. Released from all postoperative restrictions. Follow up in 2 months.   - 10/2/2023 procedure: Axonics full implant (SNM stage 2).     Urinary Symptoms:   - The patient states she has good days and bad days regarding her bladder.   - She reports varying nocturia between 2x/night to 4x/night. If she is able to get a good quality sleep cycle she notices less nocturia.   - Continues to endorse UUI episodes on her way to the bathroom with small volumes during the day. Of note, she has a recliner that reclines back up very slow and when she this further inhibits her from making it to the bathroom in time and exacerbates her UUI episodes.   - No UUI at night on her way to the bathroom.   - UUI does not occur daily but rather occurs occasionally throughout the week.   - She feels that SNM is helping improve her urinary symptoms to some degree.   - The patient states that when Tabitha (Ax[employer]ics rep) previously recalibrated her implant she noted having sensation at the site of her SNM which is higher than the site she was having sensation previously which was in her buttock, this is occasionally slightly uncomfortable.   - The patient states that she switched from Crystal Light to water for a 2 day trial and notes that she had more nocturia with drinking water only during the day. She drinks 2-3 bottles of water during the day and stops fluids by 4pm daily.   - She reports being told that she snores by her daughter.   - The patient has HTN and does not take diuretics.       Past Medical History:     Past Medical History:   Diagnosis Date    Cancer (CMS/HCC)     Glaucoma     Hyperlipidemia     Hypertension     Other specified health status     No pertinent past medical history    Personal history of malignant neoplasm, unspecified     History of malignant neoplasm    Personal history of other diseases of the musculoskeletal system and  connective tissue     History of arthritis    Personal history of other diseases of urinary system     History of bladder problems    Personal history of other endocrine, nutritional and metabolic disease     History of high cholesterol    Personal history of other specified conditions 09/08/2016    History of dysuria    Personal history of other specified conditions 12/11/2018    History of breast lump    Personal history of other specified conditions 11/06/2018    History of abnormal mammogram    Unspecified abnormal findings in urine 08/31/2016    Abnormal finding in urine        Past Surgical History:     Past Surgical History:   Procedure Laterality Date    KNEE ARTHROPLASTY      OTHER SURGICAL HISTORY  11/21/2019    lt lumpesctomy radiation       Medications:     Prior to Admission medications    Medication Sig Start Date End Date Taking? Authorizing Provider   amLODIPine (Norvasc) 10 mg tablet Take 1 tablet (10 mg) by mouth once daily. 11/22/23 11/21/24 Yes Valarie Ramos MD   anastrozole (Arimidex) 1 mg tablet Take 1 tablet (1 mg total) by mouth once daily. 11/22/23 11/21/24 Yes Valarie Ramos MD   atenolol (Tenormin) 50 mg tablet Take 1 tablet (50 mg) by mouth once daily. 11/22/23 11/21/24 Yes Valarie Ramos MD   brimonidine (AlphaGAN) 0.2 % ophthalmic solution INSTILL 1 DROP INTO BOTH EYES TWICE DAILY 3/6/24  Yes Barbra Keyes MD   calcium carbonate-vit D3-min 600 mg calcium- 400 unit tablet Take 1 tablet by mouth once daily. 4/1/19  Yes Historical Provider, MD   dorzolamide-timoloL (Cosopt) 22.3-6.8 mg/mL ophthalmic solution Administer 1 drop into both eyes 2 times a day. 11/22/23 11/21/24 Yes Valarie Ramos MD   ibuprofen 600 mg tablet Take 1 tablet (600 mg) by mouth 3 times a day as needed for mild pain (1 - 3) (pain). 10/2/23  Yes Ya Stringer MD MPH   latanoprost (Xalatan) 0.005 % ophthalmic solution Administer 1 drop into both eyes once daily at bedtime.  "11/22/23  Yes Valarie Ramos MD   lisinopril 40 mg tablet Take 1 tablet (40 mg) by mouth once daily. 11/22/23 11/21/24 Yes Valarie Ramos MD   meloxicam (Mobic) 15 mg tablet Take 0.5 tablets (7.5 mg) by mouth once daily as needed for moderate pain (4 - 6). 8/22/17  Yes Historical Provider, MD   simvastatin (Zocor) 10 mg tablet Take 1 tablet (10 mg) by mouth once daily at bedtime. 11/22/23 11/21/24 Yes Valarie Ramos MD   simvastatin (Zocor) 20 mg tablet Take 1 tablet (20 mg) by mouth once daily. 11/20/23  Yes Valarie Ramos MD   acetaminophen (Tylenol Extra Strength) 500 mg tablet Take 2 tablets (1,000 mg) by mouth every 6 hours if needed for mild pain (1 - 3).  Patient not taking: Reported on 3/28/2024 10/2/23   Ya Stringer MD MPH   diclofenac sodium 1 % kit Apply 1 g topically 2 times a day. 5/11/22   Historical Provider, MD   fluticasone (Flonase) 50 mcg/actuation nasal spray Administer 2 sprays into affected nostril(s) once daily. 8/3/20   Historical Provider, MD   omeprazole (PriLOSEC) 40 mg DR capsule Take 1 capsule (40 mg) by mouth once daily in the morning. Take before meals. Do not crush or chew. 11/30/23 12/30/23  Valarie Ramos MD   solifenacin (VESIcare) 10 mg tablet Take 1 tablet (10 mg) by mouth once daily. 1/19/21   Historical Provider, MD MARIE  Review of Systems   Constitutional: Negative.    HENT: Negative.     Eyes: Negative.    Respiratory: Negative.     Cardiovascular: Negative.    Gastrointestinal: Negative.    Endocrine: Negative.    Genitourinary:  Positive for enuresis, frequency and urgency.   Musculoskeletal: Negative.    Neurological: Negative.    Psychiatric/Behavioral: Negative.            PHYSICAL EXAM:    /68   Pulse 95   Ht 1.575 m (5' 2\")   Wt 113 kg (249 lb)   BMI 45.54 kg/m²   No LMP recorded. Patient is postmenopausal.    Declines chaperone for physical exam.      Well developed, well nourished, in no " apparent distress.   Neurologic/Psychiatric:  Awake, Alert and Oriented times 3.  Affect normal.       Data and DIAGNOSTIC STUDIES REVIEWED   Lab Results   Component Value Date    GLUCOSE 64 (L) 12/11/2023    CALCIUM 9.7 12/11/2023     12/11/2023    K 4.2 12/11/2023    CO2 27 12/11/2023     12/11/2023    BUN 14 12/11/2023    CREATININE 0.72 12/11/2023     Lab Results   Component Value Date    WBC 5.8 12/11/2023    HGB 14.7 12/11/2023    HCT 45.1 12/11/2023    MCV 96 12/11/2023     12/11/2023

## 2024-04-03 DIAGNOSIS — H40.9 GLAUCOMA, UNSPECIFIED GLAUCOMA TYPE, UNSPECIFIED LATERALITY: ICD-10-CM

## 2024-04-04 DIAGNOSIS — M25.561 RIGHT KNEE PAIN, UNSPECIFIED CHRONICITY: ICD-10-CM

## 2024-04-05 ENCOUNTER — TELEPHONE (OUTPATIENT)
Dept: HEMATOLOGY/ONCOLOGY | Facility: CLINIC | Age: 74
End: 2024-04-05
Payer: MEDICARE

## 2024-04-05 RX ORDER — LATANOPROST 50 UG/ML
1 SOLUTION/ DROPS OPHTHALMIC NIGHTLY
Qty: 2.5 ML | Refills: 3 | Status: SHIPPED | OUTPATIENT
Start: 2024-04-05

## 2024-04-05 RX ORDER — MELOXICAM 15 MG/1
7.5 TABLET ORAL DAILY PRN
Qty: 90 TABLET | Refills: 0 | Status: SHIPPED | OUTPATIENT
Start: 2024-04-05

## 2024-04-05 NOTE — TELEPHONE ENCOUNTER
Left message on personal voicemail that her Breast Cancer Index testing shows that she will not benefit with extended therapy and her overall risk of distant recurrence (0-10 years) is 13.9% and her late risk of distant recurrence (years 5-10) is 7.6% which is in the intermediate range. If she has any questions I am happy to speak with her. I will be back in the office next week. Giovanni

## 2024-04-08 ENCOUNTER — TELEPHONE (OUTPATIENT)
Dept: ADMISSION | Facility: HOSPITAL | Age: 74
End: 2024-04-08
Payer: MEDICARE

## 2024-04-08 NOTE — TELEPHONE ENCOUNTER
"The patient received Christa's message Friday, wants Christa to know that Thursday she received a 90 day supply of her anastrozole and opened it and started taking it however in Marlas message she stated \"you can finish out whatever anastrozole you have left and then you will be done\", however Drea doesn't want to finish out 3 months, she stopped taking the medication Saturday    Wants to confirm it is OK to stop the medication now and make sure that she doesn't have to continue this 3 month bottle. Message routed to Christa.   "

## 2024-04-09 NOTE — TELEPHONE ENCOUNTER
This RN called the patient back and got her personalized VM. I left her a message stating that she is OK to stop the anastrozole at this present time, she does not need to continue. I advised that she call our office back with any further questions or concerns ([phone] opt. 5 & opt. 2)

## 2024-04-25 ENCOUNTER — APPOINTMENT (OUTPATIENT)
Dept: OBSTETRICS AND GYNECOLOGY | Facility: CLINIC | Age: 74
End: 2024-04-25
Payer: MEDICARE

## 2024-04-26 ENCOUNTER — TELEPHONE (OUTPATIENT)
Dept: OBSTETRICS AND GYNECOLOGY | Facility: CLINIC | Age: 74
End: 2024-04-26

## 2024-05-08 DIAGNOSIS — N32.81 OAB (OVERACTIVE BLADDER): ICD-10-CM

## 2024-05-08 NOTE — TELEPHONE ENCOUNTER
"The following messages below is between the MA and MD regarding this medication change. A 90 day supply with 1 refill was sent to [facility] pharmacy    MA: ( Brenda Dawson )  Good afternoon Doc. Today, our office here in [place] received a faxed from a pts pharmacy regarding her medication. The fax states \"Pt requests an alternative RX due to the coverage. Alternative is Solifenacin 5mg or 10mg\" The pt is currently taking Trospium CL 20mg.  Would you like to change the script from trospium to solifenacin? The pt is Drea Anne.  46 and MRN [id number]    MD: (Dr. Stringer)  Sure. If you or one of the nurses can pend the order I can approve it. I'm at a conference this afternoon. So can't login right now    MA: ( FLORY )  I surely can. Would you like for me to send over the 10mg?    MD: Pinky)  Sure    MA: (FLORY)  Okay no problem. Thank you!    "

## 2024-05-09 RX ORDER — SOLIFENACIN SUCCINATE 10 MG/1
10 TABLET, FILM COATED ORAL DAILY
Qty: 90 TABLET | Refills: 1 | Status: SHIPPED | OUTPATIENT
Start: 2024-05-09

## 2024-05-09 NOTE — PROGRESS NOTES
"[facility] Sleep Medicine Clinic  New Visit Note        HISTORY OF PRESENT ILLNESS     The patient's referring provider is: Ya Stringer MD MPH    HISTORY OF PRESENT ILLNESS   Drea Anne is a 74 y.o. female who presents to a [facility] Clinic for a sleep medicine evaluation with concerns of Consult, Difficulties Falling Asleep, and Nocturia.     PAST SLEEP HISTORY    Patient has the following sleep-related diagnoses and sleep study results: none    CURRENT HISTORY    On today's visit, the patient reports that she wakes up 2-3 times per night to use bathroom. She has been told she snores. She doesn't feel tired except when she can't get enough sleep.    Doesn't go to bathroom as much during the day.    NECK 15.5\"    STOP  3 STP  BANG 2 BA    Sleep schedule  on weekdays / work days:  Usual Bedtime  11 p  Falls asleep around  ?  Wake time  6-7 a    Sleep schedule  on weekends/non work days :  same    Naps:   no    Average sleep duration 4-5 hours/day    Preferred sleeping position: side    Sleep-related ROS:    Sleep Initiation: no problems going to sleep    Sleep Maintenance: wakes frequent to use bathroom    Recreational drug use  Smoking: quit 1996  Alcohol consumption: ?  Caffeine consumption:  occasional  Marijuana: ?    ESS: 4      PHYSICAL EXAM     VITAL SIGNS: /80   Pulse 60   Ht 1.575 m (5' 2\")   Wt 113 kg (250 lb)   SpO2 95%   BMI 45.73 kg/m²      CURRENT WEIGHT: [unfilled]  BMI: [unfilled]  PREVIOUS WEIGHTS:  Wt Readings from Last 3 Encounters:   05/10/24 113 kg (250 lb)   03/28/24 113 kg (249 lb)   02/22/24 114 kg (252 lb)       PHYSICAL EXAM: GENERAL: alert oriented x 3 pleasant and cooperative no acute distress  MODIFIED MALLAMPATI SCORE: 3+  LATERAL PHARYNGEAL WALL: 2+  NECK EXAM: normal supple no adenopathy    RESULTS/DATA     No results found for: \"IRON\", \"TRANSFERRIN\", \"IRONSAT\", \"TIBC\", \"FERRITIN\"    ASSESSMENT/PLAN     Ms. Anne is a 74 y.o. female and " was referred to the [facility] for the following issues:    OBSTRUCTIVE SLEEP APNEA / SNORING / FREQUENT WAKING  -Ordering sleep study to evaluate  -She wishes to be on solifenacin (for OAB) first before she does sleep study    BMI>45  -Body mass index is 45.73 kg/m².  today  -With sufficient weight loss may no longer require treatment for INNA    HYPERTENSION  BP Readings from Last 3 Encounters:   05/10/24 126/80   03/28/24 109/68   02/22/24 128/68      -Well controlled with current medications    Followup 3 weeks after sleep study to review results

## 2024-05-10 ENCOUNTER — OFFICE VISIT (OUTPATIENT)
Dept: SLEEP MEDICINE | Facility: CLINIC | Age: 74
End: 2024-05-10
Payer: MEDICARE

## 2024-05-10 VITALS
OXYGEN SATURATION: 95 % | DIASTOLIC BLOOD PRESSURE: 80 MMHG | HEART RATE: 60 BPM | WEIGHT: 250 LBS | SYSTOLIC BLOOD PRESSURE: 126 MMHG | HEIGHT: 62 IN | BODY MASS INDEX: 46.01 KG/M2

## 2024-05-10 DIAGNOSIS — G47.30 SLEEP-RELATED BREATHING DISORDER: Primary | ICD-10-CM

## 2024-05-10 DIAGNOSIS — R06.83 SNORING: ICD-10-CM

## 2024-05-10 DIAGNOSIS — R35.1 NOCTURIA: ICD-10-CM

## 2024-05-10 DIAGNOSIS — I10 HYPERTENSION, UNSPECIFIED TYPE: ICD-10-CM

## 2024-05-10 DIAGNOSIS — H40.9 GLAUCOMA, UNSPECIFIED GLAUCOMA TYPE, UNSPECIFIED LATERALITY: ICD-10-CM

## 2024-05-10 DIAGNOSIS — N32.81 OAB (OVERACTIVE BLADDER): ICD-10-CM

## 2024-05-10 PROCEDURE — 1126F AMNT PAIN NOTED NONE PRSNT: CPT | Performed by: PHYSICIAN ASSISTANT

## 2024-05-10 PROCEDURE — 1036F TOBACCO NON-USER: CPT | Performed by: PHYSICIAN ASSISTANT

## 2024-05-10 PROCEDURE — 99203 OFFICE O/P NEW LOW 30 MIN: CPT | Performed by: PHYSICIAN ASSISTANT

## 2024-05-10 PROCEDURE — 3079F DIAST BP 80-89 MM HG: CPT | Performed by: PHYSICIAN ASSISTANT

## 2024-05-10 PROCEDURE — 1160F RVW MEDS BY RX/DR IN RCRD: CPT | Performed by: PHYSICIAN ASSISTANT

## 2024-05-10 PROCEDURE — 1159F MED LIST DOCD IN RCRD: CPT | Performed by: PHYSICIAN ASSISTANT

## 2024-05-10 PROCEDURE — 3074F SYST BP LT 130 MM HG: CPT | Performed by: PHYSICIAN ASSISTANT

## 2024-05-10 ASSESSMENT — SLEEP AND FATIGUE QUESTIONNAIRES
HOW LIKELY ARE YOU TO NOD OFF OR FALL ASLEEP WHILE LYING DOWN TO REST IN THE AFTERNOON WHEN CIRCUMSTANCES PERMIT: WOULD NEVER DOZE
HOW LIKELY ARE YOU TO NOD OFF OR FALL ASLEEP IN A CAR, WHILE STOPPED FOR A FEW MINUTES IN TRAFFIC: WOULD NEVER DOZE
HOW LIKELY ARE YOU TO NOD OFF OR FALL ASLEEP WHILE SITTING AND READING: HIGH CHANCE OF DOZING
SITING INACTIVE IN A PUBLIC PLACE LIKE A CLASS ROOM OR A MOVIE THEATER: WOULD NEVER DOZE
HOW LIKELY ARE YOU TO NOD OFF OR FALL ASLEEP WHILE SITTING QUIETLY AFTER LUNCH WITHOUT ALCOHOL: WOULD NEVER DOZE
HOW LIKELY ARE YOU TO NOD OFF OR FALL ASLEEP WHILE SITTING AND TALKING TO SOMEONE: WOULD NEVER DOZE
HOW LIKELY ARE YOU TO NOD OFF OR FALL ASLEEP WHILE WATCHING TV: SLIGHT CHANCE OF DOZING
ESS-CHAD TOTAL SCORE: 4
HOW LIKELY ARE YOU TO NOD OFF OR FALL ASLEEP WHEN YOU ARE A PASSENGER IN A CAR FOR AN HOUR WITHOUT A BREAK: WOULD NEVER DOZE

## 2024-05-10 ASSESSMENT — LIFESTYLE VARIABLES
HOW OFTEN DO YOU HAVE SIX OR MORE DRINKS ON ONE OCCASION: NEVER
SKIP TO QUESTIONS 9-10: 1
HOW MANY STANDARD DRINKS CONTAINING ALCOHOL DO YOU HAVE ON A TYPICAL DAY: PATIENT DOES NOT DRINK
HOW OFTEN DO YOU HAVE A DRINK CONTAINING ALCOHOL: MONTHLY OR LESS
AUDIT-C TOTAL SCORE: 1

## 2024-05-10 ASSESSMENT — PATIENT HEALTH QUESTIONNAIRE - PHQ9
1. LITTLE INTEREST OR PLEASURE IN DOING THINGS: NOT AT ALL
2. FEELING DOWN, DEPRESSED OR HOPELESS: NOT AT ALL
SUM OF ALL RESPONSES TO PHQ9 QUESTIONS 1 & 2: 0

## 2024-05-10 ASSESSMENT — PAIN SCALES - GENERAL: PAINLEVEL: 0-NO PAIN

## 2024-05-10 NOTE — PATIENT INSTRUCTIONS
Thank you for coming to the Sleep Medicine Clinic today! Your sleep medicine provider today was: Elias Tellez PA-C Below is a summary of your treatment plan, other important information, and our contact numbers:      TREATMENT PLAN     Call [phone]), option 3 to schedule your sleep study. When you have an appointment please call us back at [phone] to schedule a followup appointment 3-4 weeks after to review results.    Obstructive Sleep Apnea (INNA) is a sleep disorder where your upper airway muscles relax during sleep and the airway intermittently and repetitively narrows and collapses leading to partially blocked airway (hypopnea) or completely blocked airway (apnea) which, in turn, can disrupt breathing in sleep, lower oxygen levels while you sleep and cause night time wakings. Because both apnea and hypopnea may cause higher carbon dioxide or low oxygen levels, untreated INNA can lead to heart arrhythmia, elevation of blood pressure, and make it harder for the body to consolidate memory and facilitate metabolism (leading to higher blood sugars at night). Frequent partial arousals occur during sleep resulting in sleep deprivation and daytime sleepiness. INNA is associated with an increased risk of cardiovascular disease, stroke, hypertension, and insulin resistance. Moreover, untreated INNA with excessive daytime sleepiness can increase the risk of motor vehicular accidents.    Some conservative strategies for INNA regardless of INNA severity are:   Positional therapy - Avoid sleeping on your back.   Healthy diet and regular exercise to optimize weight is highly encouraged.   Avoid alcohol late in the evening and sedative-hypnotics as these substances can make sleep apnea worse.   Improve breathing through the nose with intranasal steroid spray, saline rinse, or antihistamines    Safety: Avoid driving vehicle and operating heavy equipment while sleepy. Drowsy driving may lead to life-threatening  motor vehicle accidents. A person driving while sleepy is 5 times more likely to have an accident. If you feel sleepy, pull over and take a short power nap (sleep for less than 30 minutes). Otherwise, ask somebody to drive you.    Treatment options for sleep apnea include weight management, positional therapy, Positive Airway Therapy (PAP) therapy, oral appliance therapy, hypoglossal nerve stimulator (Inspire) and select airway surgeries.      OUR SLEEP TESTING LOCATIONS     Our team will contact you to schedule your sleep study, however, you can contact us as follow:  Main Phone Line (scheduling only): [phone]), option 3  Adult and Pediatric Locations  [facility] (6 years and older): [facility] - 4th floor ([address]) After hours line: [phone]  [facility] (Main campus: All ages): [facility], 6th floor. After hours line: [phone]   [place] (18 years and older): [address], 2nd floor   [place] (18 years and older): [address] [facility] [address] [address]; 4th floor  After hours line: [phone]  [facility] (18 years and older) at [place]: [address]  After hours line: [phone]    [place] (5 years and older; younger considered on case-by-case basis): [address]; [address], [address]. Scheduling  After hours line: [phone]   [place] (6 years and older): [address]; [address]   [place] (6 years and older): [address] [facility] [facility], [address]  After hours line: [phone]   [place] (13 years and older) in [place]: [address], 2nd floor  After hours line: [phone]   [place] (13 year and older): [address]  After hours line: [phone]      IMPORTANT PHONE NUMBERS     Sleep Medicine Clinic Fax: [phone]  Appointments (for Adult Sleep Clinic): [phone] - option 2  Appointments (For Sleep Studies):  "[phone] - option 3  Behavioral Sleep Medicine: [phone]    [employer] ([place]): [phone]  For clinical questions and refilling prescriptions: [phone]  Anika Wylie (For [place]ane/Rosalinda): P: [phone]  F: [phone]       CONTACTING YOUR SLEEP MEDICINE PROVIDER     Send a message directly to your provider through \"My Chart\", which is the email service through your  Records Account: [email or web address]   Call [phone] and leave a message. One of the administrative assistants will forward the message to your sleep medicine provider through \"My Chart\" and/or email.     Your sleep medicine provider for this visit was: Elias Tellez PA-C  "

## 2024-05-13 RX ORDER — BRIMONIDINE TARTRATE 2 MG/ML
1 SOLUTION/ DROPS OPHTHALMIC 2 TIMES DAILY
Qty: 15 ML | Refills: 0 | Status: SHIPPED | OUTPATIENT
Start: 2024-05-13

## 2024-06-28 ENCOUNTER — TELEMEDICINE (OUTPATIENT)
Dept: OBSTETRICS AND GYNECOLOGY | Facility: CLINIC | Age: 74
End: 2024-06-28
Payer: MEDICARE

## 2024-06-28 DIAGNOSIS — Z96.82 SACRAL NERVE STIMULATOR PRESENT: ICD-10-CM

## 2024-06-28 DIAGNOSIS — N32.81 OAB (OVERACTIVE BLADDER): Primary | ICD-10-CM

## 2024-06-28 DIAGNOSIS — R35.1 NOCTURIA: ICD-10-CM

## 2024-06-28 DIAGNOSIS — R06.83 SNORING: ICD-10-CM

## 2024-06-28 PROCEDURE — 99442 PR PHYS/QHP TELEPHONE EVALUATION 11-20 MIN: CPT | Performed by: OBSTETRICS & GYNECOLOGY

## 2024-06-28 ASSESSMENT — ENCOUNTER SYMPTOMS
CARDIOVASCULAR NEGATIVE: 1
GASTROINTESTINAL NEGATIVE: 1
RESPIRATORY NEGATIVE: 1
NEUROLOGICAL NEGATIVE: 1
PSYCHIATRIC NEGATIVE: 1
MUSCULOSKELETAL NEGATIVE: 1
ENDOCRINE NEGATIVE: 1
EYES NEGATIVE: 1

## 2024-06-28 NOTE — PROGRESS NOTES
[facility] Department of Urogynecology   Ya Stringer MD, MPH   [phone]    ASSESSMENT AND PLAN:     74-year-old female with OAB s/p successful Axonics stage 2 on 10/2/2023. Comorbidities include: HTN, glaucoma, and hx of L breast cancer now AVERY and is on Arimidex.     Diagnoses:  #1 OAB  #2 Sacral nerve stimulator present  #3 Nocturia  #4 Snoring, sleep study    Plan:  OAB, SNM present, nocturia  - Continue Solifenacin 10 mg daily. Symptoms subjectively improved with this medication.  - Instructed the patient to contact Tabitha to discuss possibly adjusting the settings on the Axonix stimulator.   > She said she would call Tabitha one day next week.  - Reassured her that upping the device a little would likely help her nocturia.  - [employer]ics representatives to reach out to ensure optimal programming.    2. Snoring, sleep study  - Encouraged completion of the sleep study. INNA is likely playing a role in her nocturia.  - She plans to complete the sleep study with her daughter's assistance for proper setup.    Follow-up after sleep study results are available to reassess and adjust treatment plan as needed.    Virtual visit today: The patient's identity was confirmed and that she is located in [place].   Ya Stringer MD MPH identified herself and the patient consented to a telehealth visit today. Telehealth visit time: 9 minutes    Scribe Attestation  By signing my name below, Robert LOPEZ, Rachel, attest that this documentation has been prepared under the direction and in the presence of Ya Stringer MD MPH on 06/28/2024 at 2:53 PM.        Problem List Items Addressed This Visit          Genitourinary and Reproductive    OAB (overactive bladder) - Primary       Neuro    Sacral nerve stimulator present     Other Visit Diagnoses       Nocturia        Snoring               I spent a total of 18 minutes in face to face and non face to face time.   I Dr. Stringer, personally performed the services  described in the documentation as scribed in my presence and confirm it is both complete and accurate.  Ya Stringer MD, MPH, FACOG      Established    HISTORY OF PRESENT ILLNESS:     74-year-old female with OAB s/p successful Axonics stage 2 on 10/2/2023.     Record Review:   - 5/29/2024 Dr. Stringer note RE: OAB - She is s/p Axonix SNM (stage 2) implanted on 10/2/2023. She is now on Solifenacin 10 mg daily, but she continues to get up 1-3x nightly. We discussed if she has been on the Solifenacin for about 2 weeks, she is likely already experiencing the full sx relief the medication will give her. It takes about 2-4 weeks for it to reach its full effect. She is planning to call and make an appointment for the sleep study this week, which we encouraged her to do.  - 5/10/24 (Sleep medicine) Elias Tellez PA-C: Ordered sleep study, pt wished to be on solifenacin (for OAB) first before she does sleep study. BMI >45, discussed weight loss. Follow up 3 weeks after sleep study.   - 4/25/24 note: OAB - She hasn't had great relief from Axonics SNM. Tried a 2 week trial of turning the SNM completely off and patient noted increase in urinary frequency. Then, SNM was turned on to the lowest setting and pt noticed some improvement. Failed Solifenacin. Sent Rx of Trospium 20mg BID. She had an appt with sleep medicine on 5/10/24.   - 3/28/2024 Dr. Ya Stringer note RE: OAB - Patient is s/p Axonics SNM (stage 2) implanted on 10/2/2023. At this visit, the patient stated that it was moderately controlling her symptoms. The possibility of sleep apnea was also discussed at this visit.   - 2/22/2024 Dr. Ya Stringer note RE: OAB - Patient is s/p Axonics SNM (stage 2) implanted on 10/2/2023. At this visit we reprogrammed her implant with Tabitha because the amplitude was at level 4 with the patient endorsing pain at this level. The SNM was recalibrated and the patient felt sensation at level 2 mAmp. We advised her to decrease  drinking Crystal Light as this is a bladder irritant and gave her lifestyle changes.   - 12/21/2023 Dr. Ya Stringer note: OAB - Patient is s/p Axonics SNM (stage 2) implanted on 10/2/2023. She could not tell her device was on. We instructed her to set the SNM at a level that she feels sensation and notices urinary symptom improvement; then to not make anymore adjustments with the SNM remote/key fob. Advised her to RTC to evaluate if she has a UTI given her symptoms of nocturia/urinary frequency. We will plan to have Tabitha (Axonics rep) to be at her appointment to check her SNM settings and make program adjustments if needed.   - 10/20/2023 Dr. Ya Stringer note reviewed: OAB - Patient is s/p Axonics SNM (stage 2) implanted on 10/2/2023. She is pleased with the implant and had 50-75% improvement in her urinary symptoms. Released from all postoperative restrictions. Follow up in 2 months.   - 10/2/2023 procedure: Axonics full implant (SNM stage 2).     Interval History:  - She says she is going to get her sleep study done.  - She called and she was going to make an appointment to pick the equipment up.  - Her daughter (RN) is going to go with her to pick the equipment up since they show you how to put it on your body so that she can help her when she actually does the sleep study.  - She reports problems with her breast cancer (even though she is cancer-free) and eye issues.      Urinary Symptoms:   - She reports the Solifenacin is helping a lot.  - She says she has certain nights, like last night and the night before, where she has not had good nights of sleep.  - She says other than the poor sleep some nights, everything is good.  - She wants to continue with the Solifenacin.  - On a good night, she reports getting up with nocturia 3x/night but by the third time, it's usually morning.  - On a bad night, she reports getting up with nocturia 7-8x/night (Wednesday night, for example).  - She says, at the present  time, she is okay with how things are going with the SNM and the Solifenacin and that she just wants to stay where she is.  - She says, if she runs into more issues in the future, she will call Dr. Stringer or Tabitha.  - She inquired whether Tabitha upping the SNM one more time would help.  - When her SNM was up too high, she was unable to sit down due to associated pain.  - She says if going up a level doesn't work, she will just go down a setting again.  - She was hoping for better results since she did so well with the trials.      Social History:  - Lives with her girlfriend.  - Daughter is an RN.  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    Past Medical History:     Past Medical History:   Diagnosis Date    Cancer (Multi)     Glaucoma     Hyperlipidemia     Hypertension     Other specified health status     No pertinent past medical history    Personal history of malignant neoplasm, unspecified     History of malignant neoplasm    Personal history of other diseases of the musculoskeletal system and connective tissue     History of arthritis    Personal history of other diseases of urinary system     History of bladder problems    Personal history of other endocrine, nutritional and metabolic disease     History of high cholesterol    Personal history of other specified conditions 09/08/2016    History of dysuria    Personal history of other specified conditions 12/11/2018    History of breast lump    Personal history of other specified conditions 11/06/2018    History of abnormal mammogram    Unspecified abnormal findings in urine 08/31/2016    Abnormal finding in urine          Past Surgical History:     Past Surgical History:   Procedure Laterality Date    KNEE ARTHROPLASTY      OTHER SURGICAL HISTORY  11/21/2019    lt lumpesctomy radiation         Medications:     Prior to Admission medications    Medication Sig Start Date End Date Taking? Authorizing Provider   acetaminophen (Tylenol Extra Strength) 500 mg  tablet Take 2 tablets (1,000 mg) by mouth every 6 hours if needed for mild pain (1 - 3). 10/2/23   Ya Stringer MD MPH   amLODIPine (Norvasc) 10 mg tablet Take 1 tablet (10 mg) by mouth once daily. 11/22/23 11/21/24  Valarie Ramos MD   atenolol (Tenormin) 50 mg tablet Take 1 tablet (50 mg) by mouth once daily. 11/22/23 11/21/24  Valarie Ramos MD   brimonidine (AlphaGAN) 0.2 % ophthalmic solution Administer 1 drop into both eyes 2 times a day. 5/13/24   Valarie Ramos MD   calcium carbonate-vit D3-min 600 mg calcium- 400 unit tablet Take 1 tablet by mouth once daily. 4/1/19   Historical Provider, MD   diclofenac sodium 1 % kit Apply 1 g topically 2 times a day. 5/11/22   Historical Provider, MD   dorzolamide-timoloL (Cosopt) 22.3-6.8 mg/mL ophthalmic solution Administer 1 drop into both eyes 2 times a day. 11/22/23 11/21/24  Valarie Ramos MD   ibuprofen 600 mg tablet Take 1 tablet (600 mg) by mouth 3 times a day as needed for mild pain (1 - 3) (pain). 10/2/23   Ya Stringer MD MPH   latanoprost (Xalatan) 0.005 % ophthalmic solution INSTILL 1 DROP INTO BOTH EYES AT BEDTIME 4/5/24   Valarie Ramos MD   lisinopril 40 mg tablet Take 1 tablet (40 mg) by mouth once daily. 11/22/23 11/21/24  Valarie Ramos MD   meloxicam (Mobic) 15 mg tablet Take 0.5 tablets (7.5 mg) by mouth once daily as needed for moderate pain (4 - 6). 4/5/24   Valarie Ramos MD   omeprazole (PriLOSEC) 40 mg DR capsule Take 1 capsule (40 mg) by mouth once daily in the morning. Take before meals. Do not crush or chew. 11/30/23 5/10/24  Valarie Ramos MD   simvastatin (Zocor) 10 mg tablet Take 1 tablet (10 mg) by mouth once daily at bedtime. 11/22/23 11/21/24  Valarie Ramos MD   simvastatin (Zocor) 20 mg tablet Take 1 tablet (20 mg) by mouth once daily.  Patient not taking: Reported on 5/10/2024 11/20/23   Valarie Ramos MD  "  solifenacin (VESIcare) 10 mg tablet Take 1 tablet (10 mg) by mouth once daily. 5/9/24   Ya Stringer MD MPH   trospium (Sanctura) 20 mg tablet Take 1 tablet (20 mg) by mouth 2 times a day.  Patient not taking: Reported on 5/10/2024 4/25/24   Ya Stringer MD MPH        ROS  Review of Systems   Constitutional:         Reports 2 poor nights of sleep this week   HENT: Negative.     Eyes: Negative.    Respiratory: Negative.     Cardiovascular: Negative.    Gastrointestinal: Negative.    Endocrine: Negative.    Genitourinary:         Continues to experience nocturia, up to 7-8x on a bad night   Musculoskeletal: Negative.    Neurological: Negative.    Psychiatric/Behavioral: Negative.                  Data and DIAGNOSTIC STUDIES REVIEWED   No results found.   No results found for: \"URINECULTURE\", \"UAMICCOMM\"   Lab Results   Component Value Date    GLUCOSE 64 (L) 12/11/2023    CALCIUM 9.7 12/11/2023     12/11/2023    K 4.2 12/11/2023    CO2 27 12/11/2023     12/11/2023    BUN 14 12/11/2023    CREATININE 0.72 12/11/2023     Lab Results   Component Value Date    WBC 5.8 12/11/2023    HGB 14.7 12/11/2023    HCT 45.1 12/11/2023    MCV 96 12/11/2023     12/11/2023        "

## 2024-07-31 ENCOUNTER — HOSPITAL ENCOUNTER (OUTPATIENT)
Dept: RADIOLOGY | Facility: HOSPITAL | Age: 74
Discharge: HOME | End: 2024-07-31
Payer: MEDICARE

## 2024-07-31 ENCOUNTER — OFFICE VISIT (OUTPATIENT)
Dept: ORTHOPEDIC SURGERY | Facility: HOSPITAL | Age: 74
End: 2024-07-31
Payer: MEDICARE

## 2024-07-31 VITALS — WEIGHT: 250 LBS | BODY MASS INDEX: 46.01 KG/M2 | HEIGHT: 62 IN

## 2024-07-31 DIAGNOSIS — M17.12 OSTEOARTHRITIS OF LEFT KNEE, UNSPECIFIED OSTEOARTHRITIS TYPE: ICD-10-CM

## 2024-07-31 DIAGNOSIS — M25.562 LEFT KNEE PAIN, UNSPECIFIED CHRONICITY: Primary | ICD-10-CM

## 2024-07-31 DIAGNOSIS — M25.562 LEFT KNEE PAIN, UNSPECIFIED CHRONICITY: ICD-10-CM

## 2024-07-31 PROCEDURE — 1125F AMNT PAIN NOTED PAIN PRSNT: CPT | Performed by: PHYSICIAN ASSISTANT

## 2024-07-31 PROCEDURE — 73562 X-RAY EXAM OF KNEE 3: CPT | Mod: LEFT SIDE | Performed by: RADIOLOGY

## 2024-07-31 PROCEDURE — 99213 OFFICE O/P EST LOW 20 MIN: CPT | Mod: 25 | Performed by: PHYSICIAN ASSISTANT

## 2024-07-31 PROCEDURE — 1036F TOBACCO NON-USER: CPT | Performed by: PHYSICIAN ASSISTANT

## 2024-07-31 PROCEDURE — 73562 X-RAY EXAM OF KNEE 3: CPT | Mod: LT

## 2024-07-31 PROCEDURE — 2500000004 HC RX 250 GENERAL PHARMACY W/ HCPCS (ALT 636 FOR OP/ED): Performed by: PHYSICIAN ASSISTANT

## 2024-07-31 PROCEDURE — 20610 DRAIN/INJ JOINT/BURSA W/O US: CPT | Mod: LT | Performed by: PHYSICIAN ASSISTANT

## 2024-07-31 PROCEDURE — 99203 OFFICE O/P NEW LOW 30 MIN: CPT | Performed by: PHYSICIAN ASSISTANT

## 2024-07-31 PROCEDURE — 2500000005 HC RX 250 GENERAL PHARMACY W/O HCPCS: Performed by: PHYSICIAN ASSISTANT

## 2024-07-31 PROCEDURE — 1159F MED LIST DOCD IN RCRD: CPT | Performed by: PHYSICIAN ASSISTANT

## 2024-07-31 RX ORDER — TRIAMCINOLONE ACETONIDE 40 MG/ML
40 INJECTION, SUSPENSION INTRA-ARTICULAR; INTRAMUSCULAR
Status: COMPLETED | OUTPATIENT
Start: 2024-07-31 | End: 2024-07-31

## 2024-07-31 RX ORDER — LIDOCAINE HYDROCHLORIDE 10 MG/ML
4 INJECTION INFILTRATION; PERINEURAL
Status: COMPLETED | OUTPATIENT
Start: 2024-07-31 | End: 2024-07-31

## 2024-07-31 ASSESSMENT — PAIN SCALES - GENERAL: PAINLEVEL_OUTOF10: 1

## 2024-07-31 ASSESSMENT — PAIN DESCRIPTION - DESCRIPTORS: DESCRIPTORS: ACHING

## 2024-07-31 ASSESSMENT — PAIN - FUNCTIONAL ASSESSMENT: PAIN_FUNCTIONAL_ASSESSMENT: 0-10

## 2024-07-31 NOTE — PROGRESS NOTES
74-year-old female who presents to clinic today with complaints of left knee occasional pain and discomfort as well as abnormal appearance.  She notices more symptoms of her left knee when doing specific activities including standing for long period of time while cooking or shopping.  She has had previous left lower extremity surgery of her ankle and knee done approximately over 20 years ago however is unsure of exactly what the surgeries entailed.  She feels she has been able to manage her symptoms pretty well however wanted to get further evaluation to see what was going on.    Patient's self reported past medical history, medications, allergies, surgical history, family and social history as well as a 10 point review of systems has been documented in the new patient intake form and scanned into the patient's electronic medical record. Pertinent findings are documented in the HPI.    Physical Examination Findings:  Constitutional: Appears well-developed and well-nourished.  Head: Normocephalic and atraumatic.  Eyes: Pupils are equal and round.  Cardiovascular: Intact distal pulses.   Respiratory: Effort normal. No respiratory distress.  Neurologic: Alert and oriented to person, place, and time.  Skin: Skin is warm and dry.  Hematologic / Lymphatic: No lymphedema, lymphangitis.  Psychiatric: normal mood and affect. Behavior is normal.   Musculoskeletal: Left knee no effusion well-healed previous surgical scar over the anterior aspect of the knee.  She has no significant lateral or medial joint line tenderness.  She has a valgus alignment of the left knee.  Intact extensor mechanisms range of motion 0 to 115 degrees.    New x-rays taken today reveal degenerative changes of the left knee specifically of the lateral compartment and patellofemoral joint.  Anchors in the patella consistent with possible quadriceps tendon repair no acute findings    Impression: Left knee osteoarthritis    Plan: We had a very long  discussion today regarding these findings and at this diagnosis.  We have discussed a number of different treatment options including therapy, oral medication, topical medication as well as intra-articular steroid injections.  After long discussion today.  She has elected to proceed with injection.  This was given today and tolerated well.  Patient denies history of diabetes.  She will continue to monitor symptoms following the injection and return to our office as needed.    Patient ID: Drea Anne is a 74 y.o. female.    L Inj/Asp: L knee on 7/31/2024 12:13 PM  Indications: pain  Details: 22 G needle, lateral approach  Medications: 4 mL lidocaine 10 mg/mL (1 %); 40 mg triamcinolone acetonide 40 mg/mL  Procedure, treatment alternatives, risks and benefits explained, specific risks discussed. Consent was given by the patient. Immediately prior to procedure a time out was called to verify the correct patient, procedure, equipment, support staff and site/side marked as required.           Fay Boyle PA-C  Department of Orthopaedic Surgery  [facility]    Dictation performed with the use of voice recognition software. Syntax and grammatical errors may exist.

## 2024-08-25 DIAGNOSIS — M25.561 RIGHT KNEE PAIN, UNSPECIFIED CHRONICITY: ICD-10-CM

## 2024-08-26 RX ORDER — MELOXICAM 15 MG/1
TABLET ORAL
Qty: 45 TABLET | Refills: 0 | Status: SHIPPED | OUTPATIENT
Start: 2024-08-26

## 2024-09-11 ENCOUNTER — APPOINTMENT (OUTPATIENT)
Dept: RADIATION ONCOLOGY | Facility: HOSPITAL | Age: 74
End: 2024-09-11
Payer: MEDICARE

## 2024-09-26 ENCOUNTER — TELEPHONE (OUTPATIENT)
Dept: OBSTETRICS AND GYNECOLOGY | Facility: CLINIC | Age: 74
End: 2024-09-26
Payer: MEDICARE

## 2024-09-26 DIAGNOSIS — N32.81 OAB (OVERACTIVE BLADDER): ICD-10-CM

## 2024-09-26 RX ORDER — SOLIFENACIN SUCCINATE 10 MG/1
10 TABLET, FILM COATED ORAL DAILY
Qty: 90 TABLET | Refills: 3 | Status: SHIPPED | OUTPATIENT
Start: 2024-09-26

## 2024-09-26 NOTE — TELEPHONE ENCOUNTER
Request received for   Requested Prescriptions     Pending Prescriptions Disp Refills    solifenacin (VESIcare) 10 mg tablet 90 tablet 1     Sig: Take 1 tablet (10 mg) by mouth once daily.       Drea Anne was last seen 6/28/2024

## 2024-10-07 ENCOUNTER — TELEPHONE (OUTPATIENT)
Dept: RADIATION ONCOLOGY | Facility: HOSPITAL | Age: 74
End: 2024-10-07
Payer: MEDICARE

## 2024-10-08 ENCOUNTER — APPOINTMENT (OUTPATIENT)
Dept: RADIATION ONCOLOGY | Facility: HOSPITAL | Age: 74
End: 2024-10-08
Payer: MEDICARE

## 2024-10-30 DIAGNOSIS — I10 PRIMARY HYPERTENSION: ICD-10-CM

## 2024-10-30 DIAGNOSIS — I10 HYPERTENSION, UNSPECIFIED TYPE: ICD-10-CM

## 2024-10-30 RX ORDER — LISINOPRIL 40 MG/1
40 TABLET ORAL DAILY
Qty: 90 TABLET | Refills: 3 | Status: SHIPPED | OUTPATIENT
Start: 2024-10-30

## 2024-10-30 RX ORDER — SIMVASTATIN 10 MG/1
10 TABLET, FILM COATED ORAL NIGHTLY
Qty: 90 TABLET | Refills: 3 | Status: SHIPPED | OUTPATIENT
Start: 2024-10-30

## 2024-11-03 DIAGNOSIS — I10 PRIMARY HYPERTENSION: ICD-10-CM

## 2024-11-03 RX ORDER — AMLODIPINE BESYLATE 10 MG/1
10 TABLET ORAL DAILY
Qty: 90 TABLET | Refills: 0 | Status: SHIPPED | OUTPATIENT
Start: 2024-11-03

## 2024-11-03 RX ORDER — ATENOLOL 50 MG/1
50 TABLET ORAL DAILY
Qty: 90 TABLET | Refills: 0 | Status: SHIPPED | OUTPATIENT
Start: 2024-11-03

## 2024-11-04 DIAGNOSIS — M25.561 RIGHT KNEE PAIN, UNSPECIFIED CHRONICITY: ICD-10-CM

## 2024-11-04 RX ORDER — MELOXICAM 15 MG/1
15 TABLET ORAL DAILY
Qty: 45 TABLET | Refills: 1 | Status: SHIPPED | OUTPATIENT
Start: 2024-11-04

## 2024-11-13 ENCOUNTER — TELEPHONE (OUTPATIENT)
Dept: RADIATION ONCOLOGY | Facility: HOSPITAL | Age: 74
End: 2024-11-13
Payer: MEDICARE

## 2024-11-14 ENCOUNTER — HOSPITAL ENCOUNTER (OUTPATIENT)
Dept: RADIATION ONCOLOGY | Facility: HOSPITAL | Age: 74
Setting detail: RADIATION/ONCOLOGY SERIES
Discharge: HOME | End: 2024-11-14
Payer: MEDICARE

## 2024-11-14 VITALS
OXYGEN SATURATION: 96 % | BODY MASS INDEX: 47.55 KG/M2 | HEART RATE: 70 BPM | HEIGHT: 62 IN | DIASTOLIC BLOOD PRESSURE: 81 MMHG | WEIGHT: 258.4 LBS | RESPIRATION RATE: 18 BRPM | SYSTOLIC BLOOD PRESSURE: 144 MMHG | TEMPERATURE: 96.8 F

## 2024-11-14 DIAGNOSIS — C50.912 MALIGNANT NEOPLASM OF LEFT BREAST IN FEMALE, ESTROGEN RECEPTOR POSITIVE, UNSPECIFIED SITE OF BREAST: Primary | ICD-10-CM

## 2024-11-14 DIAGNOSIS — Z17.0 MALIGNANT NEOPLASM OF LEFT BREAST IN FEMALE, ESTROGEN RECEPTOR POSITIVE, UNSPECIFIED SITE OF BREAST: Primary | ICD-10-CM

## 2024-11-14 DIAGNOSIS — Z79.811 USE OF ANASTROZOLE (ARIMIDEX): ICD-10-CM

## 2024-11-14 PROCEDURE — 99213 OFFICE O/P EST LOW 20 MIN: CPT | Performed by: NURSE PRACTITIONER

## 2024-11-14 ASSESSMENT — ENCOUNTER SYMPTOMS
LOSS OF SENSATION IN FEET: 0
DEPRESSION: 0
OCCASIONAL FEELINGS OF UNSTEADINESS: 0

## 2024-11-14 ASSESSMENT — PAIN SCALES - GENERAL: PAINLEVEL_OUTOF10: 0-NO PAIN

## 2024-11-14 ASSESSMENT — COLUMBIA-SUICIDE SEVERITY RATING SCALE - C-SSRS
2. HAVE YOU ACTUALLY HAD ANY THOUGHTS OF KILLING YOURSELF?: NO
6. HAVE YOU EVER DONE ANYTHING, STARTED TO DO ANYTHING, OR PREPARED TO DO ANYTHING TO END YOUR LIFE?: NO
1. IN THE PAST MONTH, HAVE YOU WISHED YOU WERE DEAD OR WISHED YOU COULD GO TO SLEEP AND NOT WAKE UP?: NO

## 2024-11-14 NOTE — PROGRESS NOTES
"Radiation Oncology Follow-Up    Patient Name:  Drea Anne  MRN:  [id number]  :  1950    Referring Provider: No ref. provider found  Primary Care Provider: Tabitha Coronel MD MPH  Care Team: Patient Care Team:  Tabitha Coronel MD MPH as PCP - General (Pediatrics)    Date of Service: 2024     Cancer Staging:   Treatment Synopsis:    73 yo female with left breast invasive lobular carcinoma, grade 2, ER >95%, IN and HER2 negative. She underwent a left partial mastectomy 18,  pathology with 3 foci of invasive cancer (2.1 cm, 1.5 mm, 0.75 mm), 0/1 sentinel lymph node, negative margin, no lymphovascular space invasion oncotype recurrence score 12 (low risk for recurrence) Radiation therapy to the left breast with 42.56 Gy in  16 fractions followed by a boost of 10 Gy in 5 fractions completed 19     SUBJECTIVE  History of Present Illness:   Drea Anne is here today for follow up post radiation now over 5 yrs. She is doing well overall and has completed 5 yrs of endocrine therapy with anastrozole. Bone density in 2023 with osteopenia. She is taking daily calcium/Vit D supp. Her only complaint is weight gain. Due for mammogram in December. Denies any breast complaints/palpable findings, lymphedema of left arm or difficulty with ROM of left arm. Denies headaches, fever, chills, cough, SOB, chest pain, N/V, change in bowel habits or bony pain. She is fairly active and tries to exercise when she is up to it on her treadmill.       Review of Systems:    Review of Systems   All other systems reviewed and are negative.    Performance Status:   The Karnofsky performance scale today is 100, Fully active, able to carry on all pre-disease performed without restriction (ECOG equivalent 0).      OBJECTIVE  Vital Signs:  /81   Pulse 70   Temp 36 °C (96.8 °F) (Temporal)   Resp 18   Ht 1.575 m (5' 2\")   Wt 117 kg (258 lb 6.4 oz)   SpO2 96%   BMI 47.26 kg/m²      Current Outpatient " Medications:     acetaminophen (Tylenol Extra Strength) 500 mg tablet, Take 2 tablets (1,000 mg) by mouth every 6 hours if needed for mild pain (1 - 3)., Disp: 30 tablet, Rfl: 0    amLODIPine (Norvasc) 10 mg tablet, TAKE 1 TABLET ONE TIME DAILY, Disp: 90 tablet, Rfl: 0    atenolol (Tenormin) 50 mg tablet, TAKE 1 TABLET ONE TIME DAILY, Disp: 90 tablet, Rfl: 0    brimonidine (AlphaGAN) 0.2 % ophthalmic solution, Administer 1 drop into both eyes 2 times a day., Disp: 15 mL, Rfl: 0    calcium carbonate-vit D3-min 600 mg calcium- 400 unit tablet, Take 1 tablet by mouth once daily., Disp: , Rfl:     diclofenac sodium 1 % kit, Apply 1 g topically 2 times a day., Disp: , Rfl:     dorzolamide-timoloL (Cosopt) 22.3-6.8 mg/mL ophthalmic solution, Administer 1 drop into both eyes 2 times a day., Disp: 10 mL, Rfl: 3    ibuprofen 600 mg tablet, Take 1 tablet (600 mg) by mouth 3 times a day as needed for mild pain (1 - 3) (pain)., Disp: 60 tablet, Rfl: 0    latanoprost (Xalatan) 0.005 % ophthalmic solution, INSTILL 1 DROP INTO BOTH EYES AT BEDTIME, Disp: 2.5 mL, Rfl: 3    lisinopril 40 mg tablet, TAKE 1 TABLET ONE TIME DAILY, Disp: 90 tablet, Rfl: 3    meloxicam (Mobic) 15 mg tablet, Take 1 tablet (15 mg) by mouth once daily., Disp: 45 tablet, Rfl: 1    simvastatin (Zocor) 10 mg tablet, TAKE 1 TABLET AT BEDTIME, Disp: 90 tablet, Rfl: 3    simvastatin (Zocor) 20 mg tablet, Take 1 tablet (20 mg) by mouth once daily., Disp: 90 tablet, Rfl: 1    solifenacin (VESIcare) 10 mg tablet, Take 1 tablet (10 mg) by mouth once daily., Disp: 90 tablet, Rfl: 3    trospium (Sanctura) 20 mg tablet, Take 1 tablet (20 mg) by mouth 2 times a day., Disp: 60 tablet, Rfl: 2    omeprazole (PriLOSEC) 40 mg DR capsule, Take 1 capsule (40 mg) by mouth once daily in the morning. Take before meals. Do not crush or chew., Disp: 30 capsule, Rfl: 0     Physical Exam  Vitals reviewed.   Constitutional:       Appearance: Normal appearance. She is obese.   HENT:       Head: Normocephalic and atraumatic.      Nose: Nose normal.      Mouth/Throat:      Mouth: Mucous membranes are moist.      Pharynx: Oropharynx is clear.   Eyes:      Conjunctiva/sclera: Conjunctivae normal.      Pupils: Pupils are equal, round, and reactive to light.   Cardiovascular:      Rate and Rhythm: Normal rate and regular rhythm.      Heart sounds: Normal heart sounds.   Pulmonary:      Effort: Pulmonary effort is normal.      Breath sounds: Normal breath sounds.   Chest:   Breasts:     Right: No swelling, inverted nipple, mass, nipple discharge or skin change.      Left: No swelling, inverted nipple, mass or nipple discharge.          Comments: Left breast with well healed surgical incision.  Skin intact. Mottled pigment changes intertriginous region  Abdominal:      Palpations: Abdomen is soft.   Musculoskeletal:         General: No swelling. Normal range of motion.      Cervical back: Normal range of motion and neck supple.   Lymphadenopathy:      Cervical: No cervical adenopathy.      Upper Body:      Right upper body: No supraclavicular or axillary adenopathy.      Left upper body: No supraclavicular or axillary adenopathy.   Skin:     General: Skin is warm and dry.   Neurological:      General: No focal deficit present.      Mental Status: She is alert and oriented to person, place, and time.   Psychiatric:         Mood and Affect: Mood normal.         Behavior: Behavior normal.         RESULTS:    Narrative & Impression   Interpreted By:  Boubacar Mix,   STUDY:  DEXA BONE [id number]/24/2023 10:09 am      INDICATION:  Signs/Symptoms: screening. The patient is a 72 y/o  year old F.      COMPARISON:  Most recent prior is from 11/04/2020..      ACCESSION NUMBER(S):  [id number]      ORDERING CLINICIAN:  DESIREE GAUTHIER      TECHNIQUE:  DEXA BONE DENSITY      FINDINGS:  SPINE L1-L4  Bone Mineral Density: 1.29  T-Score 2.2  Z-Score 4.7  Bone Mineral Density change vs baseline:  3.3  Bone Mineral  Density change vs previous: 0.4      LEFT FEMUR -TOTAL  Bone Mineral Density: 0.917  T-Score -0.2   Z-Score  1.6  Bone Mineral Density change vs baseline: 10.7  Bone Mineral Density change vs previous: -7.6      LEFT FEMUR -NECK  Bone Mineral Density: 0.709  T-Score -1.3  Z-Score 0.9      World Health Organization (WHO) criteria for post-menopausal,   Women:  Normal:         T-score at or above -1 SD  Osteopenia:   T-score between -1 and -2.5 SD  Osteoporosis: T-score at or below -2.5 SD      10-year Fracture Risk:  Major Osteoporotic Fracture  8.9  Hip Fracture                        1.4      Note:  If no FRAX score is reported, it is because:  Some T-score for Spine Total or Hip Total or Femoral Neck at or below  -2.5      This exam was performed at [facility] on a [employer]: [employer] C Dexa Unit.      IMPRESSION:  DEXA:  According to World Health Organization criteria,  classification is low bone mass (osteopenia)     ASSESSMENT:  74 y.o. female with early stage left breast cancer s/p breast conserving surgery followed by radiation. Cosmetic outcome is excellent. No noted late effects from radiation. Mammogram and follow up with Dr. Bryant annually. Radiation follow up now on prn basis. She will call with any question or concerns.     Alejandra Sparks CNP  [phone]

## 2024-12-02 ENCOUNTER — APPOINTMENT (OUTPATIENT)
Dept: RADIOLOGY | Facility: CLINIC | Age: 74
End: 2024-12-02
Payer: MEDICARE

## 2024-12-12 ENCOUNTER — APPOINTMENT (OUTPATIENT)
Dept: URGENT CARE | Age: 74
End: 2024-12-12
Payer: MEDICARE

## 2024-12-13 ENCOUNTER — TELEPHONE (OUTPATIENT)
Dept: PRIMARY CARE | Facility: CLINIC | Age: 74
End: 2024-12-13

## 2024-12-15 DIAGNOSIS — M62.838 MUSCLE SPASM: Primary | ICD-10-CM

## 2024-12-15 RX ORDER — CYCLOBENZAPRINE HCL 5 MG
5 TABLET ORAL NIGHTLY PRN
Qty: 30 TABLET | Refills: 0 | Status: SHIPPED | OUTPATIENT
Start: 2024-12-15 | End: 2025-02-13

## 2025-01-03 ENCOUNTER — APPOINTMENT (OUTPATIENT)
Dept: RADIOLOGY | Facility: CLINIC | Age: 75
End: 2025-01-03
Payer: MEDICARE

## 2025-01-09 DIAGNOSIS — I10 PRIMARY HYPERTENSION: ICD-10-CM

## 2025-01-09 RX ORDER — ATENOLOL 50 MG/1
50 TABLET ORAL DAILY
Qty: 90 TABLET | Refills: 0 | Status: SHIPPED | OUTPATIENT
Start: 2025-01-09

## 2025-01-09 RX ORDER — AMLODIPINE BESYLATE 10 MG/1
10 TABLET ORAL DAILY
Qty: 90 TABLET | Refills: 0 | Status: SHIPPED | OUTPATIENT
Start: 2025-01-09

## 2025-01-10 DIAGNOSIS — I10 PRIMARY HYPERTENSION: ICD-10-CM

## 2025-01-10 NOTE — TELEPHONE ENCOUNTER
Pt states that she has a cold, she wanted to know if you could prescribe her something to help get rid of the cold

## 2025-01-13 RX ORDER — AMLODIPINE BESYLATE 10 MG/1
10 TABLET ORAL DAILY
Qty: 90 TABLET | Refills: 0 | Status: SHIPPED | OUTPATIENT
Start: 2025-01-13

## 2025-01-13 RX ORDER — ATENOLOL 50 MG/1
50 TABLET ORAL DAILY
Qty: 90 TABLET | Refills: 0 | Status: SHIPPED | OUTPATIENT
Start: 2025-01-13

## 2025-01-30 ENCOUNTER — APPOINTMENT (OUTPATIENT)
Dept: PRIMARY CARE | Facility: CLINIC | Age: 75
End: 2025-01-30
Payer: MEDICARE

## 2025-02-05 ENCOUNTER — HOSPITAL ENCOUNTER (OUTPATIENT)
Dept: RADIOLOGY | Facility: CLINIC | Age: 75
Discharge: HOME | End: 2025-02-05
Payer: MEDICARE

## 2025-02-05 VITALS — BODY MASS INDEX: 47.48 KG/M2 | WEIGHT: 258 LBS | HEIGHT: 62 IN

## 2025-02-05 DIAGNOSIS — Z12.31 ENCOUNTER FOR SCREENING MAMMOGRAM FOR MALIGNANT NEOPLASM OF BREAST: ICD-10-CM

## 2025-02-05 DIAGNOSIS — Z17.0 MALIGNANT NEOPLASM OF UPPER-OUTER QUADRANT OF LEFT BREAST IN FEMALE, ESTROGEN RECEPTOR POSITIVE: ICD-10-CM

## 2025-02-05 DIAGNOSIS — C50.412 MALIGNANT NEOPLASM OF UPPER-OUTER QUADRANT OF LEFT BREAST IN FEMALE, ESTROGEN RECEPTOR POSITIVE: ICD-10-CM

## 2025-02-05 PROCEDURE — 77063 BREAST TOMOSYNTHESIS BI: CPT | Performed by: RADIOLOGY

## 2025-02-05 PROCEDURE — 77067 SCR MAMMO BI INCL CAD: CPT | Performed by: RADIOLOGY

## 2025-02-05 PROCEDURE — 77063 BREAST TOMOSYNTHESIS BI: CPT

## 2025-02-06 ENCOUNTER — TELEPHONE (OUTPATIENT)
Dept: HEMATOLOGY/ONCOLOGY | Facility: CLINIC | Age: 75
End: 2025-02-06
Payer: MEDICARE

## 2025-02-06 NOTE — TELEPHONE ENCOUNTER
Nurse called patient to update them on mammogram results. Advise that she results are negative and that she is good to follow up in 1 yr with standard mammogram. Patient is scheduled to see dennise at [facility] in march.    CHERYL LOVE RN

## 2025-02-11 ENCOUNTER — APPOINTMENT (OUTPATIENT)
Dept: HEMATOLOGY/ONCOLOGY | Facility: HOSPITAL | Age: 75
End: 2025-02-11
Payer: MEDICARE

## 2025-03-18 ENCOUNTER — OFFICE VISIT (OUTPATIENT)
Dept: HEMATOLOGY/ONCOLOGY | Facility: HOSPITAL | Age: 75
End: 2025-03-18
Payer: MEDICARE

## 2025-03-18 VITALS
TEMPERATURE: 97 F | HEART RATE: 65 BPM | WEIGHT: 258 LBS | DIASTOLIC BLOOD PRESSURE: 76 MMHG | RESPIRATION RATE: 17 BRPM | BODY MASS INDEX: 47.18 KG/M2 | SYSTOLIC BLOOD PRESSURE: 114 MMHG | OXYGEN SATURATION: 94 %

## 2025-03-18 DIAGNOSIS — C50.412 MALIGNANT NEOPLASM OF UPPER-OUTER QUADRANT OF LEFT BREAST IN FEMALE, ESTROGEN RECEPTOR POSITIVE: ICD-10-CM

## 2025-03-18 DIAGNOSIS — Z92.21 HISTORY OF AROMATASE INHIBITOR THERAPY: ICD-10-CM

## 2025-03-18 DIAGNOSIS — Z09 ONCOLOGY FOLLOW-UP ENCOUNTER: ICD-10-CM

## 2025-03-18 DIAGNOSIS — Z12.31 ENCOUNTER FOR SCREENING MAMMOGRAM FOR MALIGNANT NEOPLASM OF BREAST: Primary | ICD-10-CM

## 2025-03-18 DIAGNOSIS — Z17.0 MALIGNANT NEOPLASM OF UPPER-OUTER QUADRANT OF LEFT BREAST IN FEMALE, ESTROGEN RECEPTOR POSITIVE: ICD-10-CM

## 2025-03-18 PROCEDURE — 1160F RVW MEDS BY RX/DR IN RCRD: CPT | Performed by: NURSE PRACTITIONER

## 2025-03-18 PROCEDURE — 1159F MED LIST DOCD IN RCRD: CPT | Performed by: NURSE PRACTITIONER

## 2025-03-18 PROCEDURE — 3078F DIAST BP <80 MM HG: CPT | Performed by: NURSE PRACTITIONER

## 2025-03-18 PROCEDURE — 1126F AMNT PAIN NOTED NONE PRSNT: CPT | Performed by: NURSE PRACTITIONER

## 2025-03-18 PROCEDURE — 3074F SYST BP LT 130 MM HG: CPT | Performed by: NURSE PRACTITIONER

## 2025-03-18 PROCEDURE — 99215 OFFICE O/P EST HI 40 MIN: CPT | Performed by: NURSE PRACTITIONER

## 2025-03-18 ASSESSMENT — PATIENT HEALTH QUESTIONNAIRE - PHQ9
2. FEELING DOWN, DEPRESSED OR HOPELESS: NOT AT ALL
1. LITTLE INTEREST OR PLEASURE IN DOING THINGS: NOT AT ALL
SUM OF ALL RESPONSES TO PHQ9 QUESTIONS 1 & 2: 0

## 2025-03-18 ASSESSMENT — PAIN SCALES - GENERAL: PAINLEVEL_OUTOF10: 0-NO PAIN

## 2025-03-19 PROBLEM — Z09 ONCOLOGY FOLLOW-UP ENCOUNTER: Status: ACTIVE | Noted: 2023-05-08

## 2025-03-19 PROBLEM — Z12.31 ENCOUNTER FOR SCREENING MAMMOGRAM FOR MALIGNANT NEOPLASM OF BREAST: Status: ACTIVE | Noted: 2023-05-08

## 2025-03-19 PROBLEM — Z92.21 HISTORY OF AROMATASE INHIBITOR THERAPY: Status: ACTIVE | Noted: 2025-03-19

## 2025-03-19 NOTE — PROGRESS NOTES
"Oncology Follow-Up    Drea Anne  [id number]        Cancer Staging   Breast cancer, left (Multi)  Staging form: Breast, AJCC 8th Edition  - Pathologic stage from 11/1/2018: Stage IIA (pT2, pN0(sn), cM0, G2, ER+, NE-, HER2-, Oncotype DX score: 12) - Unsigned    Oncology History    No history exists.   Cancer History:   Treatment Synopsis:    1. Status post left lumpectomy and SLND for a multi-focal ILC, grade 2, with invasive components measuring 2.1cm, 1.5mm and 0.75mm, respectively;  0/1 SLN.  2. Oncotype recurrence score 12 (low recurrence risk).  3. Status post adjuvant XRT to left breast.   4. Started on anastrozole on 3/27/19.  5. Breast Cancer Index testing 4.5.24 showed a 13.9% risk of distant recurrence, a 7.6% risk of late recurrence, and no benefit with extended therapy. Anastrozole discontinues May 2024.          Subjective    Drea presents for her Oncology Follow Up Visit. She is doing monthly Breast self exams. She rates her energy level as 4-5/10 due to poor sleep. She has more energy in the summer. She has urinary frequency. Drea rates her distress score as 3/10 as she has \"a lot on my mind\". Drea denies any unusual headaches, balance issues, depression, cough, shortness of breath, problems swallowing, changes in chest/breast area, abdominal pain, bone or muscle pain, vaginal bleeding, rectal bleeding, blood in the urine, vaginal dryness, swelling arms or legs, new or unusual skin moles or lesions.         Objective      Vitals:    03/18/25 1058   BP: 114/76   Pulse: 65   Resp: 17   Temp: 36.1 °C (97 °F)   SpO2: 94%        Constitutional: Well developed, alert/oriented x3, no distress, cooperative   Eyes: clear sclera   ENMT: mucous membranes moist, no apparent lesions   Head/Neck: Neck supple, no bruits   Respiratory/Thorax: Patent airways, normal breath sounds with good chest expansion   Cardiovascular: Regular rate and rhythm, no murmurs, 2+ equal pulses of the extremities,   Gastrointestinal: " Nondistended, soft, non-tender, no masses palpable, no organomegaly   Musculoskeletal: ROM intact, no joint swelling, normal strength   Extremities: normal extremities, no edema, cyanosis, contusions or wounds   Neurological: alert and oriented x3,  normal strength   Breast:     Lymphatic: No significant lymphadenopathy   Psychological: Appropriate mood and behavior   Skin: Warm and dry, no lesions, no rashes      Physical Exam  Chest:          Comments: Left breast + for breast conserving surgery with well healed UOQ and left axillary incisions; no masses, nodules, skin changes, discharge. Right breast without masses, nodules, skin changes, discharge. Both breasts with mostly fat replaced tissue.           Lab Results   Component Value Date    WBC 5.8 12/11/2023    HGB 14.7 12/11/2023    HCT 45.1 12/11/2023    MCV 96 12/11/2023     12/11/2023       Chemistry    Lab Results   Component Value Date/Time     12/11/2023 1241    K 4.2 12/11/2023 1241     12/11/2023 1241    CO2 27 12/11/2023 1241    BUN 14 12/11/2023 1241    CREATININE 0.72 12/11/2023 1241    Lab Results   Component Value Date/Time    CALCIUM 9.7 12/11/2023 1241    ALKPHOS 98 12/11/2023 1241    AST 24 12/11/2023 1241    ALT 17 12/11/2023 1241    BILITOT 0.3 12/11/2023 1241           Imaging:    Exam Information    Status Exam Begun Exam Ended   Final 2/05/2025 09:55 2/05/2025 10:22     Study Result    Narrative & Impression   Interpreted By:  Yolette Romeo,   STUDY:  BI MAMMO BILATERAL SCREENING TOMOSYNTHESIS;  2/5/2025 10:22 am      ACCESSION NUMBER(S):  [id number]      ORDERING CLINICIAN:  ARMANDO MCCLELLAN      INDICATION:  Screening. History of left lumpectomy with radiation      ,C50.412 Malignant neoplasm of upper-outer quadrant of left female  breast,Z17.0 Estrogen receptor positive status (ER+),Z12.31 Encounter  for screening mammogram for malignant neoplasm of breast      COMPARISON:  11/24/2023 09/12/2022      FINDINGS:  2D and  tomosynthesis images were reviewed at 1 mm slice thickness.      Density:  There are scattered areas of fibroglandular density.      Posttreatment changes are again seen in the left breast. No  suspicious masses or calcifications are identified.      IMPRESSION:  No mammographic evidence of malignancy.      BI-RADS CATEGORY:  BI-RADS Category:  2 Benign.  Recommendation:  Annual Screening.  Recommended Date:  1 Year.  Laterality:  Bilateral.   Plan:    11/24/2023 10:33 [phone] [id number]     Exam Information    Status Exam Begun Exam Ended   Final 11/24/2023 10:09 11/24/2023 10:09     Study Result    Narrative & Impression   Interpreted By:  Boubacar Mix,   STUDY:  DEXA BONE [id number]/24/2023 10:09 am      INDICATION:  Signs/Symptoms: screening. The patient is a 72 y/o  year old F.      COMPARISON:  Most recent prior is from 11/04/2020..      ACCESSION NUMBER(S):  [id number]      ORDERING CLINICIAN:  DESIREE GAUTHIER      TECHNIQUE:  DEXA BONE DENSITY      FINDINGS:  SPINE L1-L4  Bone Mineral Density: 1.29  T-Score 2.2  Z-Score 4.7  Bone Mineral Density change vs baseline:  3.3  Bone Mineral Density change vs previous: 0.4      LEFT FEMUR -TOTAL  Bone Mineral Density: 0.917  T-Score -0.2   Z-Score  1.6  Bone Mineral Density change vs baseline: 10.7  Bone Mineral Density change vs previous: -7.6      LEFT FEMUR -NECK  Bone Mineral Density: 0.709  T-Score -1.3  Z-Score 0.9          World Health Organization (WHO) criteria for post-menopausal,   Women:  Normal:         T-score at or above -1 SD  Osteopenia:   T-score between -1 and -2.5 SD  Osteoporosis: T-score at or below -2.5 SD          10-year Fracture Risk:  Major Osteoporotic Fracture  8.9  Hip Fracture                        1.4      Note:  If no FRAX score is reported, it is because:  Some T-score for Spine Total or Hip Total or Femoral Neck at or below  -2.5      This exam was performed at [facility] on a [employer] C  Dexa Unit.      IMPRESSION:  DEXA:  According to World Health Organization criteria,  classification is low bone mass (osteopenia)     Assessment/Plan    Drea is a 74 yo woman with a hx of T2N0 multi-focal IDC . She is s/p partial mastectomy, XRT and completed a 5 year course of anastrozole in May 2024. There is no evidence of recurrent disease on today's exam.    Plan:  Exam is negative.  Mammogram from February is negative.  Bone density due at next visit.  Consider dermatologic check.  Encouraged monthly breast self exams, plant based diet, keep alcohol <3 drinks/week, exercise at least 2.5 hours/week.  We reviewed signs/symptoms of recurrence including new masses, new pigmented lesion, tugging or pulling of the skin, nipple discharge, rash in or around the chest area, or any new finding that doesn't resolve within a 2-3 weeks.  All of Drea's questions/concerns were addressed.  Over 30 minutes of time was spent with this patient with >50% of the time with education, counseling, and coordination of care.     Diagnoses and all orders for this visit:  Encounter for screening mammogram for malignant neoplasm of breast  -     BI mammo bilateral screening tomosynthesis; Future  Malignant neoplasm of upper-outer quadrant of left breast in female, estrogen receptor positive  -     Clinic Appointment Request Follow Up; ARMANDO MCCLELLAN  -     Clinic Appointment Request; Future  -     BI mammo bilateral screening tomosynthesis; Future  History of aromatase inhibitor therapy  Oncology follow-up encounter          Armando Mcclellan, KELLEY-CNP

## 2025-03-28 DIAGNOSIS — I10 PRIMARY HYPERTENSION: ICD-10-CM

## 2025-03-28 RX ORDER — AMLODIPINE BESYLATE 10 MG/1
10 TABLET ORAL DAILY
Qty: 30 TABLET | Refills: 0 | Status: SHIPPED | OUTPATIENT
Start: 2025-03-28 | End: 2025-04-27

## 2025-03-28 RX ORDER — ATENOLOL 50 MG/1
50 TABLET ORAL DAILY
Qty: 30 TABLET | Refills: 0 | Status: SHIPPED | OUTPATIENT
Start: 2025-03-28 | End: 2025-04-27

## 2025-04-14 ENCOUNTER — APPOINTMENT (OUTPATIENT)
Dept: PRIMARY CARE | Facility: CLINIC | Age: 75
End: 2025-04-14
Payer: MEDICARE

## 2025-04-18 ENCOUNTER — APPOINTMENT (OUTPATIENT)
Dept: PRIMARY CARE | Facility: CLINIC | Age: 75
End: 2025-04-18
Payer: MEDICARE

## 2025-04-18 VITALS
HEART RATE: 65 BPM | SYSTOLIC BLOOD PRESSURE: 110 MMHG | WEIGHT: 256 LBS | HEIGHT: 62 IN | DIASTOLIC BLOOD PRESSURE: 70 MMHG | BODY MASS INDEX: 47.11 KG/M2 | OXYGEN SATURATION: 98 %

## 2025-04-18 DIAGNOSIS — E66.01 MORBID OBESITY WITH BMI OF 45.0-49.9, ADULT (MULTI): ICD-10-CM

## 2025-04-18 DIAGNOSIS — R73.01 IFG (IMPAIRED FASTING GLUCOSE): ICD-10-CM

## 2025-04-18 DIAGNOSIS — Z11.59 NEED FOR HEPATITIS C SCREENING TEST: ICD-10-CM

## 2025-04-18 DIAGNOSIS — M25.561 RIGHT KNEE PAIN, UNSPECIFIED CHRONICITY: ICD-10-CM

## 2025-04-18 DIAGNOSIS — I10 PRIMARY HYPERTENSION: Primary | ICD-10-CM

## 2025-04-18 DIAGNOSIS — E78.5 HYPERLIPIDEMIA, UNSPECIFIED HYPERLIPIDEMIA TYPE: ICD-10-CM

## 2025-04-18 DIAGNOSIS — M17.10 ARTHRITIS OF KNEE: ICD-10-CM

## 2025-04-18 PROBLEM — E66.813 CLASS 3 SEVERE OBESITY DUE TO EXCESS CALORIES WITH BODY MASS INDEX (BMI) OF 45.0 TO 49.9 IN ADULT: Status: RESOLVED | Noted: 2023-10-02 | Resolved: 2025-04-18

## 2025-04-18 RX ORDER — ATENOLOL 50 MG/1
50 TABLET ORAL DAILY
Qty: 90 TABLET | Refills: 1 | Status: SHIPPED | OUTPATIENT
Start: 2025-04-18

## 2025-04-18 RX ORDER — MELOXICAM 15 MG/1
15 TABLET ORAL DAILY
Qty: 45 TABLET | Refills: 1 | Status: SHIPPED | OUTPATIENT
Start: 2025-04-18

## 2025-04-18 RX ORDER — AMLODIPINE BESYLATE 10 MG/1
10 TABLET ORAL DAILY
Qty: 90 TABLET | Refills: 1 | Status: SHIPPED | OUTPATIENT
Start: 2025-04-18

## 2025-04-18 ASSESSMENT — PATIENT HEALTH QUESTIONNAIRE - PHQ9
SUM OF ALL RESPONSES TO PHQ9 QUESTIONS 1 AND 2: 0
1. LITTLE INTEREST OR PLEASURE IN DOING THINGS: NOT AT ALL
2. FEELING DOWN, DEPRESSED OR HOPELESS: NOT AT ALL

## 2025-04-18 NOTE — PROGRESS NOTES
"Subjective   Patient ID: Drea Anne is a 75 y.o. female who presents for Establish Care.    HPI   The patient is 75 year old woman with PMH significant for HTN, Hyperlipidemia and breast cancer who presents to establish medical care.  C/O chronic left knee stiffness and pain.  Requesting a refill on Meloxicam.  The patient presents with C/O weight gain. Recent dietary changes with reduced caloric intake in addition to exercise are not effective. The patient is inquiring about the treatment options, weight and BMI goals.  BP monitoring, refills request.  Review of Systems  Weight gain  Knee stiffness, pain, left  Objective   /70   Pulse 65   Ht 1.575 m (5' 2\")   Wt 116 kg (256 lb)   SpO2 98%   BMI 46.82 kg/m²     Physical Exam  NAD. Cooperative.  Lungs CTA  Heart: RRR  LE: negative     Assessment/Plan   Diagnoses and all orders for this visit:  Primary hypertension  -     Comprehensive metabolic panel; Future  -     atenolol (Tenormin) 50 mg tablet; Take 1 tablet (50 mg) by mouth once daily.  -     amLODIPine (Norvasc) 10 mg tablet; Take 1 tablet (10 mg) by mouth once daily.  -     CBC; Future  -     Albumin-Creatinine Ratio, Urine Random; Future  Morbid obesity with BMI of 45.0-49.9, adult (Multi)  -     tirzepatide, weight loss, (Zepbound) 2.5 mg/0.5 mL injection; Inject 2.5 mg under the skin every 7 days.  Right knee pain, unspecified chronicity  -     meloxicam (Mobic) 15 mg tablet; Take 1 tablet (15 mg) by mouth once daily.  IFG (impaired fasting glucose)  -     Hemoglobin A1C; Future  Hyperlipidemia, unspecified hyperlipidemia type  -     TSH; Future  -     Lipid panel; Future  -     CT cardiac scoring wo IV contrast; Future  Need for hepatitis C screening test  -     Hepatitis C Antibody; Future  Arthritis of knee  -     Referral to Orthopedics and Sports Medicine; Future    Discussed health benefits from whole food plant based diet. Bibliotherapy: The Atiya Sanchez, ERINN More, PhD, " suggested  Discussed weight goals, 150 lb, currently at 106 lb excess, recommended gradual weight loss with daily caloric reduction in addition to low to moderate intensity exercise as tolerated.    In addition to Meloxicam, recommended Tylenol 650 mg, 2 tablets every 8 hours, PRN, Aquatic therapy.    3 months follow up.  Reviewed medical records from 10/20 to 3/25, discussed with the patient.  Over 50% of time during the office visit was spent on coordination of care and counseling from 11:00 to 12:00.

## 2025-04-19 LAB
ALBUMIN SERPL-MCNC: 4.2 G/DL (ref 3.6–5.1)
ALBUMIN/CREAT UR: 6 MG/G CREAT
ALP SERPL-CCNC: 99 U/L (ref 37–153)
ALT SERPL-CCNC: 14 U/L (ref 6–29)
ANION GAP SERPL CALCULATED.4IONS-SCNC: 10 MMOL/L (CALC) (ref 7–17)
AST SERPL-CCNC: 20 U/L (ref 10–35)
BILIRUB SERPL-MCNC: 0.6 MG/DL (ref 0.2–1.2)
BUN SERPL-MCNC: 14 MG/DL (ref 7–25)
CALCIUM SERPL-MCNC: 9.2 MG/DL (ref 8.6–10.4)
CHLORIDE SERPL-SCNC: 108 MMOL/L (ref 98–110)
CHOLEST SERPL-MCNC: 200 MG/DL
CHOLEST/HDLC SERPL: 3.2 (CALC)
CO2 SERPL-SCNC: 26 MMOL/L (ref 20–32)
CREAT SERPL-MCNC: 0.63 MG/DL (ref 0.6–1)
CREAT UR-MCNC: 193 MG/DL (ref 20–275)
EGFRCR SERPLBLD CKD-EPI 2021: 91 ML/MIN/1.73M2
ERYTHROCYTE [DISTWIDTH] IN BLOOD BY AUTOMATED COUNT: 13.4 % (ref 11–15)
EST. AVERAGE GLUCOSE BLD GHB EST-MCNC: 114 MG/DL
EST. AVERAGE GLUCOSE BLD GHB EST-SCNC: 6.3 MMOL/L
GLUCOSE SERPL-MCNC: 83 MG/DL (ref 65–99)
HBA1C MFR BLD: 5.6 %
HCT VFR BLD AUTO: 46.8 % (ref 35–45)
HCV AB SERPL QL IA: NORMAL
HDLC SERPL-MCNC: 62 MG/DL
HGB BLD-MCNC: 14.9 G/DL (ref 11.7–15.5)
LDLC SERPL CALC-MCNC: 122 MG/DL (CALC)
MCH RBC QN AUTO: 31.4 PG (ref 27–33)
MCHC RBC AUTO-ENTMCNC: 31.8 G/DL (ref 32–36)
MCV RBC AUTO: 98.7 FL (ref 80–100)
MICROALBUMIN UR-MCNC: 1.2 MG/DL
NONHDLC SERPL-MCNC: 138 MG/DL (CALC)
PLATELET # BLD AUTO: 176 THOUSAND/UL (ref 140–400)
PMV BLD REES-ECKER: 10.6 FL (ref 7.5–12.5)
POTASSIUM SERPL-SCNC: 4.5 MMOL/L (ref 3.5–5.3)
PROT SERPL-MCNC: 7 G/DL (ref 6.1–8.1)
RBC # BLD AUTO: 4.74 MILLION/UL (ref 3.8–5.1)
SODIUM SERPL-SCNC: 144 MMOL/L (ref 135–146)
TRIGL SERPL-MCNC: 72 MG/DL
TSH SERPL-ACNC: 1.11 MIU/L (ref 0.4–4.5)
WBC # BLD AUTO: 5.5 THOUSAND/UL (ref 3.8–10.8)

## 2025-05-15 ENCOUNTER — TELEPHONE (OUTPATIENT)
Dept: PRIMARY CARE | Facility: CLINIC | Age: 75
End: 2025-05-15
Payer: MEDICARE

## 2025-05-15 DIAGNOSIS — M25.561 RIGHT KNEE PAIN, UNSPECIFIED CHRONICITY: ICD-10-CM

## 2025-05-15 DIAGNOSIS — M62.838 MUSCLE SPASM: Primary | ICD-10-CM

## 2025-05-15 RX ORDER — MELOXICAM 15 MG/1
15 TABLET ORAL DAILY
Qty: 30 TABLET | Refills: 1 | Status: SHIPPED | OUTPATIENT
Start: 2025-05-15

## 2025-05-15 RX ORDER — TIZANIDINE 2 MG/1
2-4 TABLET ORAL EVERY 8 HOURS PRN
Qty: 30 TABLET | Refills: 0 | Status: SHIPPED | OUTPATIENT
Start: 2025-05-15

## 2025-06-27 ENCOUNTER — APPOINTMENT (OUTPATIENT)
Dept: OBSTETRICS AND GYNECOLOGY | Facility: CLINIC | Age: 75
End: 2025-06-27
Payer: MEDICARE

## 2025-07-03 ENCOUNTER — CLINICAL SUPPORT (OUTPATIENT)
Dept: AUDIOLOGY | Facility: CLINIC | Age: 75
End: 2025-07-03
Payer: MEDICARE

## 2025-07-03 ENCOUNTER — APPOINTMENT (OUTPATIENT)
Dept: OTOLARYNGOLOGY | Facility: CLINIC | Age: 75
End: 2025-07-03
Payer: MEDICARE

## 2025-07-03 VITALS — WEIGHT: 256 LBS | BODY MASS INDEX: 46.82 KG/M2

## 2025-07-03 DIAGNOSIS — H93.8X3 SENSATION OF FULLNESS IN BOTH EARS: ICD-10-CM

## 2025-07-03 DIAGNOSIS — H90.3 SENSORINEURAL HEARING LOSS, BILATERAL: Primary | ICD-10-CM

## 2025-07-03 DIAGNOSIS — H93.13 TINNITUS OF BOTH EARS: ICD-10-CM

## 2025-07-03 DIAGNOSIS — H61.23 BILATERAL IMPACTED CERUMEN: Primary | ICD-10-CM

## 2025-07-03 PROCEDURE — 92550 TYMPANOMETRY & REFLEX THRESH: CPT | Mod: 52

## 2025-07-03 PROCEDURE — 92557 COMPREHENSIVE HEARING TEST: CPT

## 2025-07-03 PROCEDURE — 1159F MED LIST DOCD IN RCRD: CPT | Performed by: NURSE PRACTITIONER

## 2025-07-03 PROCEDURE — 1036F TOBACCO NON-USER: CPT | Performed by: NURSE PRACTITIONER

## 2025-07-03 PROCEDURE — 99203 OFFICE O/P NEW LOW 30 MIN: CPT | Performed by: NURSE PRACTITIONER

## 2025-07-03 PROCEDURE — 1160F RVW MEDS BY RX/DR IN RCRD: CPT | Performed by: NURSE PRACTITIONER

## 2025-07-03 ASSESSMENT — PATIENT HEALTH QUESTIONNAIRE - PHQ9
SUM OF ALL RESPONSES TO PHQ9 QUESTIONS 1 AND 2: 0
2. FEELING DOWN, DEPRESSED OR HOPELESS: NOT AT ALL
1. LITTLE INTEREST OR PLEASURE IN DOING THINGS: NOT AT ALL

## 2025-07-03 NOTE — PROGRESS NOTES
ADULT AUDIOLOGY EVALUATION      Name:  Drea Anne   :  1950  Age:  75 y.o.  Date of Evaluation:  7/3/2025    Time: 09:30-09:50    IMPRESSIONS     Right ear: normal middle ear functioning, normal hearing sensitivity sloping to mild sensorineural hearing loss, and excellent (96%) word understanding at 60 dB HL.   Left ear: normal middle ear functioning, normal hearing sensitivity sloping to mild sensorineural hearing loss, and excellent (96%) word understanding at 60 dB HL.     Amplification needs: Pending medical clearance, binaural amplification could be considered due to amount and type of hearing loss. Drea noted that she is not interested in pursuing amplification at this time as she does not find difficulty hearing on a daily basis. She reported that she plans to get annual ear cleanings and hearing tests in order to monitor.    RECOMMENDATIONS     Continue medical follow up with primary care provider and/or Ears Nose and Throat (ENT) provider as recommended.  Re-test hearing annually in order to monitor hearing sensitivity.  Consider amplification pending medical clearance and patient motivation.  Appropriate communication techniques (face-to-face at a 4-6 foot maximum distance, reduced background noise, speech at normal volume and slower rate, good lighting, etc).   Avoid exposure to loud sounds by moving away from the noise, turning down the volume, or wearing proper hearing protection correctly.    HISTORY     Ms. Anne, 75 y.o., was seen today for an initial audiologic evaluation in conjunction with an evaluation with Samina Chatterjee APRN.CNP; See same day encounter in the Ears Nose and Throat (ENT) department for additional information. History obtained from patient report and chart review.     Drea reported that she occasionally struggles to hear depending on the environment and noise around her. She finds that she occasionally turns up the volume on her TV. She endorsed constant, bilateral  tinnitus. She noted dizziness seldomly when laying down which she describes as a spinning sensation that lasts for only seconds at a time. It does not occur regularly. She denied aural fullness, recent ear infections, otalgia, otorrhea, history of otologic surgeries or history of loud noise exposure.     AUDIOGRAM         TEST RESULTS     Otoscopic Evaluation:  Right Ear: Ear canal clear, tympanic membrane visualized.  Left Ear: Ear canal clear, tympanic membrane visualized.    Tympanometry (226 Hz): This test is an objective evaluation of middle ear function. CPT code: 92728   Right Ear: Type A, middle ear pressure and tympanic membrane compliance within normal limits.   Left Ear: Type A, middle ear pressure and tympanic membrane compliance within normal limits.     Acoustic Reflexes: This test is an objective measure of auditory and facial nerve pathways.   (Probe) Right Ear (ipsi right stimulus ear; contralateral left stimulus ear): (Ipsilateral) Responses present at expected levels for 500-4000 Hz.  (Probe) Left Ear (ipsi left stimulus ear; contralateral right stimulus ear): (Ipsilateral) Responses present at expected levels for 500-4000 Hz.    Distortion Product Otoacoustic Emissions (DPOAE): This test is a measurement of responses which are generated by the cochlea when it is simultaneously stimulated by two pure tone frequencies. CPT code: 39091   Right Ear: Did not test.  Left Ear: Did not test.    Test technique: Conventional Audiometry via headphones. This test is an evaluation hearing sensitivity via air and bone conduction and speech recognition testing. CPT code: 07858  Reliability: good    Pure Tone Audiometry (125-8000 Hz):     Right Ear: Hearing sensitivity within normal limits for 125-1000 Hz, sloping to mild sensorineural hearing loss 2712-1634 Hz.  Left Ear: Hearing sensitivity within normal limits for 125-1000 Hz, sloping to mild sensorineural hearing loss 6863-4026 Hz.    Speech Audiometry:    Right Ear: Speech Reception Threshold (SRT) was obtained at 20 dB HL. This is in good agreement with three frequency Pure Tone Average.   Word Recognition scores were excellent (96%) in quiet when words were presented at 60 dB HL. These results are based on [facility] Auditory Test No.6 (NU-6) Ordered by difficulty (N=10).  Left Ear: Speech Reception Threshold (SRT) was obtained at 20 dB HL. This is in good agreement with three frequency Pure Tone Average.   Word Recognition scores were excellent (96%) in quiet when words were presented at 60 dB HL. These results are based on [facility] Auditory Test No.6 (NU-6) Ordered by difficulty (N=10).     Comparison of today's results with previous test results: No previous results available.          Willie Blackmon, CCC-A, [facility]  Senior Clinical Audiologist -  Audiology Services       Degree of   Hearing Sensitivity dB Range   Within Normal Limits (WNL) 0 - 20   Slight 25   Mild 26 - 40   Moderate 41 - 55   Moderately-Severe 56 - 70   Severe 71 - 90   Profound 91 +     Key   CHL Conductive Hearing Loss   ECV Ear Canal Volume   HA Hearing Aid   NIHL Noise-Induced Hearing Loss   PTA Pure Tone Average   SNHL Sensorineural Hearing Loss   TM Tympanic Membrane   WNL Within Normal Limits

## 2025-07-03 NOTE — PROGRESS NOTES
Subjective   Patient ID: Drea Anne is a 75 y.o. female who presents for Cerumen Impaction.  HPI  This patient presents to reestablish care for cerumen removal.  Her last cleaning was in December 2021.  Today, she endorses bilateral fullness and increased bilateral tinnitus.  Her bilateral tinnitus has been present for many years.  She does not recall when she last had an audiogram.  She does not wear hearing amplification.  She denies any history of otologic surgery.  Review of Systems  A comprehensive or 10 points review of the patient's constitutional, neurological, HEENT, pulmonary, cardiovascular and genito-urinary systems showed only those mentioned in history of present illness.    Objective   Physical Exam  Constitutional: no fever, chills, weight loss or weight gain   General appearance: Appears well, well-nourished, well groomed. No acute distress.   Communication: Normal communication   Psychiatric: Oriented to person, place and time. Normal mood and affect.   Neurologic: Cranial nerves II-XII grossly intact and symmetric bilaterally.   Head and Face:   Head: Atraumatic with no masses, lesions or scarring.   Face: Normal symmetry, no paralysis, synkinesis or facial tic. No scars or deformities.     Eyes: Conjunctiva not edematous or erythematous   Ears: External inspection of ears with no deformity, scars or masses.  Bilateral canals with cerumen impaction     Neck: Normal appearing, symmetric, trachea midline.   Cardiovascular: Examination of peripheral vascular system shows no clubbing or cyanosis.   Respiratory: No respiratory distress increased work of breathing. Inspection of the chest with symmetric chest expansion and normal respiratory effort.   Skin: No rashes in the head or neck    Assessment/Plan        This patient presents for initial evaluation of acute acquired bilateral cerumen impaction and bilateral aural fullness as well as chronic bilateral subjective tinnitus.    Reassurance given  that otologic exam is normal after cleaning.  She is scheduled for an audiogram today.  I encouraged her to keep this appointment given her longstanding tinnitus.  The likely etiology of the tinnitus was reviewed. The various masking and distraction techniques were discussed. All questions were answered to the patient's satisfaction.  Patient ID: Drea Anne is a 75 y.o. female.    Ear cerumen removal    Date/Time: 7/3/2025 8:53 AM    Performed by: NEELIMA Mariano  Authorized by: NEELIMA Mariano    Consent:     Consent obtained:  Verbal    Consent given by:  Patient    Risks discussed:  Pain    Alternatives discussed:  No treatment  Procedure details:     Location:  L ear and R ear    Procedure type comment:  Suction    Procedure outcomes: cerumen removed    Post-procedure details:     Inspection:  No bleeding, ear canal clear and TM intact    Hearing quality:  Improved    Procedure completion:  Tolerated well, no immediate complications      NEELIMA Mariano 07/03/25 8:51 AM

## 2025-07-16 DIAGNOSIS — N32.81 OAB (OVERACTIVE BLADDER): ICD-10-CM

## 2025-07-16 RX ORDER — SOLIFENACIN SUCCINATE 10 MG/1
10 TABLET, FILM COATED ORAL DAILY
Qty: 90 TABLET | Refills: 3 | OUTPATIENT
Start: 2025-07-16

## 2025-07-16 NOTE — TELEPHONE ENCOUNTER
Gilbert Shepard,     We are currently unable to process your request for solifenacin/Vesicare, because you have not been seen in the office in over a year.  Your last appointment was 6/28/2024.  Please call our office to schedule an appointment at your earliest convenience at [phone], and choose option 1. Called and talk to the pt and she said that she has enough medication to last her for another month and she will call back  at another time to make appt    Thank you,    Buzz CHAVEZ

## 2025-07-26 ENCOUNTER — HOSPITAL ENCOUNTER (OUTPATIENT)
Dept: RADIOLOGY | Facility: HOSPITAL | Age: 75
Discharge: HOME | End: 2025-07-26
Payer: MEDICARE

## 2025-07-26 DIAGNOSIS — E78.5 HYPERLIPIDEMIA, UNSPECIFIED HYPERLIPIDEMIA TYPE: ICD-10-CM

## 2025-07-26 PROCEDURE — 75571 CT HRT W/O DYE W/CA TEST: CPT

## 2025-07-30 PROBLEM — R93.1 AGATSTON CAC SCORE, >400: Status: ACTIVE | Noted: 2025-07-30

## 2025-07-31 DIAGNOSIS — I10 PRIMARY HYPERTENSION: ICD-10-CM

## 2025-07-31 DIAGNOSIS — I10 HYPERTENSION, UNSPECIFIED TYPE: ICD-10-CM

## 2025-07-31 RX ORDER — SIMVASTATIN 10 MG/1
10 TABLET, FILM COATED ORAL NIGHTLY
Qty: 90 TABLET | Refills: 3 | Status: SHIPPED | OUTPATIENT
Start: 2025-07-31

## 2025-07-31 RX ORDER — LISINOPRIL 40 MG/1
40 TABLET ORAL DAILY
Qty: 90 TABLET | Refills: 3 | Status: SHIPPED | OUTPATIENT
Start: 2025-07-31

## 2025-08-11 ENCOUNTER — APPOINTMENT (OUTPATIENT)
Dept: PRIMARY CARE | Facility: CLINIC | Age: 75
End: 2025-08-11
Payer: MEDICARE

## 2025-08-11 VITALS
WEIGHT: 256.2 LBS | OXYGEN SATURATION: 98 % | HEART RATE: 80 BPM | DIASTOLIC BLOOD PRESSURE: 70 MMHG | SYSTOLIC BLOOD PRESSURE: 124 MMHG | BODY MASS INDEX: 46.86 KG/M2

## 2025-08-11 DIAGNOSIS — R93.1 AGATSTON CAC SCORE, >400: ICD-10-CM

## 2025-08-11 DIAGNOSIS — E78.5 HYPERLIPIDEMIA, UNSPECIFIED HYPERLIPIDEMIA TYPE: ICD-10-CM

## 2025-08-11 DIAGNOSIS — E66.01 MORBID OBESITY WITH BMI OF 45.0-49.9, ADULT (MULTI): Primary | ICD-10-CM

## 2025-08-11 DIAGNOSIS — I10 BENIGN HYPERTENSION: ICD-10-CM

## 2025-08-11 PROCEDURE — 99214 OFFICE O/P EST MOD 30 MIN: CPT | Performed by: INTERNAL MEDICINE

## 2025-08-11 PROCEDURE — 3074F SYST BP LT 130 MM HG: CPT | Performed by: INTERNAL MEDICINE

## 2025-08-11 PROCEDURE — 1159F MED LIST DOCD IN RCRD: CPT | Performed by: INTERNAL MEDICINE

## 2025-08-11 PROCEDURE — 1160F RVW MEDS BY RX/DR IN RCRD: CPT | Performed by: INTERNAL MEDICINE

## 2025-08-11 PROCEDURE — 3078F DIAST BP <80 MM HG: CPT | Performed by: INTERNAL MEDICINE

## 2025-08-11 RX ORDER — ROSUVASTATIN CALCIUM 20 MG/1
20 TABLET, COATED ORAL DAILY
Qty: 90 TABLET | Refills: 3 | Status: SHIPPED | OUTPATIENT
Start: 2025-08-11

## 2025-08-11 RX ORDER — SEMAGLUTIDE 0.25 MG/.5ML
0.25 INJECTION, SOLUTION SUBCUTANEOUS WEEKLY
Qty: 2 ML | Refills: 2 | Status: SHIPPED | OUTPATIENT
Start: 2025-08-11

## 2025-08-22 ENCOUNTER — TELEPHONE (OUTPATIENT)
Dept: PRIMARY CARE | Facility: CLINIC | Age: 75
End: 2025-08-22
Payer: MEDICARE

## (undated) DEVICE — CABLE, PERCUTANEOUS EXTENSION, INTERSTIM, FOR 4.32MM LEAD

## (undated) DEVICE — Device

## (undated) DEVICE — SUTURE, SILK, 2-0, 30 IN, SH, BLACK

## (undated) DEVICE — SUTURE, MONOCRYL, 3-0, 27 IN, SH, UNDYED

## (undated) DEVICE — STRIP, SKIN CLOSURE, STERI STRIP, REINFORCED, 0.5 X 4 IN

## (undated) DEVICE — DRESSING, SPONGE, GAUZE, CURITY, 4 X 4 IN, STERILE

## (undated) DEVICE — SUTURE, VICRYL, 3-0, 27 IN, CT-2, UNDYED

## (undated) DEVICE — DRAPE, C-ARM IMAGE

## (undated) DEVICE — DRAPE, SHEET, THREE QUARTER, FAN FOLD, 57 X 77 IN

## (undated) DEVICE — DRAPE, SHEET, ENDOSCOPY, GENERAL, FENESTRATED, ARMBOARD COVER, 98 X 123.5 IN, DISPOSABLE, LF, STERILE

## (undated) DEVICE — DRESSING, GAUZE, DRAIN SPONGE, 6 PLY, EXCILON, 4 X 4 IN, STERILE

## (undated) DEVICE — SUTURE, PROLENE, 2-0, 30 IN, SH, BLUE

## (undated) DEVICE — REMOTE CONTROL, PATIENT (2301)